# Patient Record
Sex: MALE | Race: WHITE | Employment: OTHER | URBAN - METROPOLITAN AREA
[De-identification: names, ages, dates, MRNs, and addresses within clinical notes are randomized per-mention and may not be internally consistent; named-entity substitution may affect disease eponyms.]

---

## 2017-04-05 ENCOUNTER — HOSPITAL ENCOUNTER (EMERGENCY)
Facility: CLINIC | Age: 47
Discharge: HOME | End: 2017-04-05
Attending: EMERGENCY MEDICINE | Admitting: EMERGENCY MEDICINE
Payer: COMMERCIAL

## 2017-04-05 ENCOUNTER — APPOINTMENT (OUTPATIENT)
Dept: GENERAL RADIOLOGY | Facility: CLINIC | Age: 47
End: 2017-04-05
Attending: EMERGENCY MEDICINE
Payer: COMMERCIAL

## 2017-04-05 VITALS
TEMPERATURE: 97.6 F | RESPIRATION RATE: 18 BRPM | WEIGHT: 240 LBS | OXYGEN SATURATION: 97 % | HEIGHT: 77 IN | DIASTOLIC BLOOD PRESSURE: 80 MMHG | SYSTOLIC BLOOD PRESSURE: 123 MMHG | BODY MASS INDEX: 28.34 KG/M2

## 2017-04-05 DIAGNOSIS — R07.1 PAINFUL RESPIRATION: ICD-10-CM

## 2017-04-05 LAB
ALBUMIN SERPL-MCNC: 3.6 G/DL (ref 3.4–5)
ALP SERPL-CCNC: 119 U/L (ref 40–150)
ALT SERPL W P-5'-P-CCNC: 23 U/L (ref 0–70)
ANION GAP SERPL CALCULATED.3IONS-SCNC: 7 MMOL/L (ref 3–14)
AST SERPL W P-5'-P-CCNC: 19 U/L (ref 0–45)
BASOPHILS # BLD AUTO: 0.1 10E9/L (ref 0–0.2)
BASOPHILS NFR BLD AUTO: 1.1 %
BILIRUB SERPL-MCNC: 0.1 MG/DL (ref 0.2–1.3)
BUN SERPL-MCNC: 17 MG/DL (ref 7–30)
CALCIUM SERPL-MCNC: 8 MG/DL (ref 8.5–10.1)
CHLORIDE SERPL-SCNC: 102 MMOL/L (ref 94–109)
CO2 SERPL-SCNC: 30 MMOL/L (ref 20–32)
CREAT SERPL-MCNC: 1.04 MG/DL (ref 0.66–1.25)
D DIMER PPP FEU-MCNC: NORMAL UG/ML FEU (ref 0–0.5)
DIFFERENTIAL METHOD BLD: NORMAL
EOSINOPHIL # BLD AUTO: 0.1 10E9/L (ref 0–0.7)
EOSINOPHIL NFR BLD AUTO: 1.8 %
ERYTHROCYTE [DISTWIDTH] IN BLOOD BY AUTOMATED COUNT: 13.7 % (ref 10–15)
GFR SERPL CREATININE-BSD FRML MDRD: 76 ML/MIN/1.7M2
GLUCOSE SERPL-MCNC: 110 MG/DL (ref 70–99)
HCT VFR BLD AUTO: 41.2 % (ref 40–53)
HGB BLD-MCNC: 14.1 G/DL (ref 13.3–17.7)
IMM GRANULOCYTES # BLD: 0 10E9/L (ref 0–0.4)
IMM GRANULOCYTES NFR BLD: 0.3 %
INTERPRETATION ECG - MUSE: NORMAL
LYMPHOCYTES # BLD AUTO: 2.8 10E9/L (ref 0.8–5.3)
LYMPHOCYTES NFR BLD AUTO: 37.4 %
MCH RBC QN AUTO: 30.5 PG (ref 26.5–33)
MCHC RBC AUTO-ENTMCNC: 34.2 G/DL (ref 31.5–36.5)
MCV RBC AUTO: 89 FL (ref 78–100)
MONOCYTES # BLD AUTO: 0.7 10E9/L (ref 0–1.3)
MONOCYTES NFR BLD AUTO: 9 %
NEUTROPHILS # BLD AUTO: 3.7 10E9/L (ref 1.6–8.3)
NEUTROPHILS NFR BLD AUTO: 50.4 %
NRBC # BLD AUTO: 0 10*3/UL
NRBC BLD AUTO-RTO: 0 /100
NT-PROBNP SERPL-MCNC: 202 PG/ML (ref 0–450)
PLATELET # BLD AUTO: 319 10E9/L (ref 150–450)
POTASSIUM SERPL-SCNC: 3.9 MMOL/L (ref 3.4–5.3)
PROT SERPL-MCNC: 6.4 G/DL (ref 6.8–8.8)
RBC # BLD AUTO: 4.63 10E12/L (ref 4.4–5.9)
SODIUM SERPL-SCNC: 139 MMOL/L (ref 133–144)
TROPONIN I BLD-MCNC: 0 UG/L (ref 0–0.1)
TROPONIN I SERPL-MCNC: NORMAL UG/L (ref 0–0.04)
WBC # BLD AUTO: 7.4 10E9/L (ref 4–11)

## 2017-04-05 PROCEDURE — 25000132 ZZH RX MED GY IP 250 OP 250 PS 637: Mod: GY | Performed by: EMERGENCY MEDICINE

## 2017-04-05 PROCEDURE — 85379 FIBRIN DEGRADATION QUANT: CPT | Performed by: EMERGENCY MEDICINE

## 2017-04-05 PROCEDURE — 93005 ELECTROCARDIOGRAM TRACING: CPT

## 2017-04-05 PROCEDURE — 71020 XR CHEST 2 VW: CPT

## 2017-04-05 PROCEDURE — 36415 COLL VENOUS BLD VENIPUNCTURE: CPT | Performed by: EMERGENCY MEDICINE

## 2017-04-05 PROCEDURE — 25000128 H RX IP 250 OP 636: Performed by: EMERGENCY MEDICINE

## 2017-04-05 PROCEDURE — 84484 ASSAY OF TROPONIN QUANT: CPT

## 2017-04-05 PROCEDURE — 83880 ASSAY OF NATRIURETIC PEPTIDE: CPT | Performed by: EMERGENCY MEDICINE

## 2017-04-05 PROCEDURE — 96374 THER/PROPH/DIAG INJ IV PUSH: CPT

## 2017-04-05 PROCEDURE — 80053 COMPREHEN METABOLIC PANEL: CPT | Performed by: EMERGENCY MEDICINE

## 2017-04-05 PROCEDURE — 96375 TX/PRO/DX INJ NEW DRUG ADDON: CPT

## 2017-04-05 PROCEDURE — A9270 NON-COVERED ITEM OR SERVICE: HCPCS | Mod: GY | Performed by: EMERGENCY MEDICINE

## 2017-04-05 PROCEDURE — 84484 ASSAY OF TROPONIN QUANT: CPT | Mod: 91 | Performed by: EMERGENCY MEDICINE

## 2017-04-05 PROCEDURE — 85025 COMPLETE CBC W/AUTO DIFF WBC: CPT | Performed by: EMERGENCY MEDICINE

## 2017-04-05 PROCEDURE — 99285 EMERGENCY DEPT VISIT HI MDM: CPT | Mod: 25

## 2017-04-05 RX ORDER — IBUPROFEN 800 MG/1
800 TABLET, FILM COATED ORAL EVERY 8 HOURS PRN
Qty: 30 TABLET | Refills: 0 | Status: ON HOLD | OUTPATIENT
Start: 2017-04-05 | End: 2019-02-18

## 2017-04-05 RX ORDER — ASPIRIN 325 MG
325 TABLET ORAL ONCE
Status: COMPLETED | OUTPATIENT
Start: 2017-04-05 | End: 2017-04-05

## 2017-04-05 RX ORDER — HYDROMORPHONE HYDROCHLORIDE 1 MG/ML
0.5 INJECTION, SOLUTION INTRAMUSCULAR; INTRAVENOUS; SUBCUTANEOUS
Status: DISCONTINUED | OUTPATIENT
Start: 2017-04-05 | End: 2017-04-05 | Stop reason: HOSPADM

## 2017-04-05 RX ORDER — KETOROLAC TROMETHAMINE 30 MG/ML
30 INJECTION, SOLUTION INTRAMUSCULAR; INTRAVENOUS ONCE
Status: COMPLETED | OUTPATIENT
Start: 2017-04-05 | End: 2017-04-05

## 2017-04-05 RX ADMIN — KETOROLAC TROMETHAMINE 30 MG: 30 INJECTION, SOLUTION INTRAMUSCULAR at 01:48

## 2017-04-05 RX ADMIN — ASPIRIN 325 MG ORAL TABLET 325 MG: 325 PILL ORAL at 01:48

## 2017-04-05 RX ADMIN — HYDROMORPHONE HYDROCHLORIDE 0.5 MG: 1 INJECTION, SOLUTION INTRAMUSCULAR; INTRAVENOUS; SUBCUTANEOUS at 01:48

## 2017-04-05 ASSESSMENT — ENCOUNTER SYMPTOMS
COUGH: 0
SORE THROAT: 0
FEVER: 0

## 2017-04-05 NOTE — ED AVS SNAPSHOT
[facility]    [address]    [facility] [address]    Phone:  [phone]    Fax:  [phone]                                       Rah Pearl   MRN: [id number]    Department:  [facility]   Date of Visit:  4/5/2017           Patient Information     Date Of Birth          1970        Your diagnoses for this visit were:     Painful respiration        You were seen by Chuck Carvalho MD.      Follow-up Information     Follow up with Gerry Holliday Schedule an appointment as soon as possible for a visit in 2 days.    Specialty:  Anesthesiology    Contact information:    [address] [address]  [phone]          Follow up with [facility].    Specialty:  EMERGENCY MEDICINE    Why:  As needed, If symptoms worsen    Contact information:    [address] [facility] [id number] [phone]      Discharge References/Attachments     PLEURISY (ENGLISH)      24 Hour Appointment Hotline       To make an appointment at any [facility], call [phone] ([phone]). If you don't have a family doctor or clinic, we will help you find one. [facility] are conveniently located to serve the needs of you and your family.             Review of your medicines      START taking        Dose / Directions Last dose taken    ibuprofen 800 MG tablet   Commonly known as:  ADVIL/MOTRIN   Dose:  800 mg   Quantity:  30 tablet        Take 1 tablet (800 mg) by mouth every 8 hours as needed for moderate pain   Refills:  0          Our records show that you are taking the medicines listed below. If these are incorrect, please call your family doctor or clinic.        Dose / Directions Last dose taken    hydrOXYzine 25 MG tablet   Commonly known as:  ATARAX   Dose:  50 mg   Quantity:  50 tablet        Take 2 tablets (50 mg) by  mouth every 6 hours as needed for itching   Refills:  0        latanoprost 0.005 % ophthalmic solution   Commonly known as:  XALATAN   Dose:  1 drop        Place 1 drop into both eyes At Bedtime   Refills:  0        LEXAPRO PO        Refills:  0        morphine 30 MG 12 hr tablet   Commonly known as:  MS CONTIN   Dose:  30 mg   Quantity:  30 tablet        Take 1 tablet (30 mg) by mouth every 12 hours   Refills:  0        ULTRAM PO        Refills:  0                Prescriptions were sent or printed at these locations (1 Prescription)                   Other Prescriptions                Printed at Department/Unit printer (1 of 1)         ibuprofen (ADVIL/MOTRIN) 800 MG tablet                Procedures and tests performed during your visit     BNP    CBC with platelets differential    Chest XR,  PA & LAT    Comprehensive metabolic panel    D dimer quantitative    EKG 12 lead    Troponin I    Troponin POCT      Orders Needing Specimen Collection     None      Pending Results     Date and Time Order Name Status Description    4/5/2017 0238 Chest XR,  PA & LAT Preliminary     4/5/2017 0036 EKG 12 lead Preliminary             Pending Culture Results     No orders found from 4/3/2017 to 4/6/2017.            Test Results From Your Hospital Stay        4/5/2017  1:06 AM      Component Results     Component Value Ref Range & Units Status    Troponin I 0.00 0.00 - 0.10 ug/L Final         4/5/2017  1:57 AM      Component Results     Component Value Ref Range & Units Status    WBC 7.4 4.0 - 11.0 10e9/L Final    RBC Count 4.63 4.4 - 5.9 10e12/L Final    Hemoglobin 14.1 13.3 - 17.7 g/dL Final    Hematocrit 41.2 40.0 - 53.0 % Final    MCV 89 78 - 100 fl Final    MCH 30.5 26.5 - 33.0 pg Final    MCHC 34.2 31.5 - 36.5 g/dL Final    RDW 13.7 10.0 - 15.0 % Final    Platelet Count 319 150 - 450 10e9/L Final    Diff Method Automated Method  Final    % Neutrophils 50.4 % Final    % Lymphocytes 37.4 % Final    % Monocytes 9.0 % Final    %  Eosinophils 1.8 % Final    % Basophils 1.1 % Final    % Immature Granulocytes 0.3 % Final    Nucleated RBCs 0 0 /100 Final    Absolute Neutrophil 3.7 1.6 - 8.3 10e9/L Final    Absolute Lymphocytes 2.8 0.8 - 5.3 10e9/L Final    Absolute Monocytes 0.7 0.0 - 1.3 10e9/L Final    Absolute Eosinophils 0.1 0.0 - 0.7 10e9/L Final    Absolute Basophils 0.1 0.0 - 0.2 10e9/L Final    Abs Immature Granulocytes 0.0 0 - 0.4 10e9/L Final    Absolute Nucleated RBC 0.0  Final         4/5/2017  2:16 AM      Component Results     Component Value Ref Range & Units Status    Sodium 139 133 - 144 mmol/L Final    Potassium 3.9 3.4 - 5.3 mmol/L Final    Chloride 102 94 - 109 mmol/L Final    Carbon Dioxide 30 20 - 32 mmol/L Final    Anion Gap 7 3 - 14 mmol/L Final    Glucose 110 (H) 70 - 99 mg/dL Final    Urea Nitrogen 17 7 - 30 mg/dL Final    Creatinine 1.04 0.66 - 1.25 mg/dL Final    GFR Estimate 76 >60 mL/min/1.7m2 Final    Non  GFR Calc    GFR Estimate If Black >90   GFR Calc   >60 mL/min/1.7m2 Final    Calcium 8.0 (L) 8.5 - 10.1 mg/dL Final    Bilirubin Total 0.1 (L) 0.2 - 1.3 mg/dL Final    Albumin 3.6 3.4 - 5.0 g/dL Final    Protein Total 6.4 (L) 6.8 - 8.8 g/dL Final    Alkaline Phosphatase 119 40 - 150 U/L Final    ALT 23 0 - 70 U/L Final    AST 19 0 - 45 U/L Final         4/5/2017  1:59 AM      Component Results     Component Value Ref Range & Units Status    Troponin I ES  0.000 - 0.045 ug/L Final    <0.015  The 99th percentile for upper reference range is 0.045 ug/L.  Troponin values in   the range of 0.045 - 0.120 ug/L may be associated with risks of adverse   clinical events.                 4/5/2017  1:59 AM      Component Results     Component Value Ref Range & Units Status    N-Terminal Pro BNP Inpatient 202 0 - 450 pg/mL Final    Reference range shown and results flagged as abnormal are suggested inpatient   cut points for confirming diagnosis if CHF in an acute setting. Establishing   a    baseline value for each individual patient is useful for follow-up. An   inpatient or emergency department NT-proPBNP <300 pg/mL effectively rules out   acute CHF, with 99% negative predictive value.  The outpatient non-acute reference range for ruling out CHF is:   0-125 pg/mL (age 18 to less than 75)   0-450 pg/mL (age 75 yrs and older)           4/5/2017  2:25 AM      Component Results     Component Value Ref Range & Units Status    D Dimer  0.0 - 0.50 ug/ml FEU Final    <0.3  This D-dimer assay is intended for use in conjuntion with a clinical pretest   probability assessment model to exclude pulmonary embolism (PE) and as an aid   in the diagnosis of deep venous thrombosis (DVT) in outpatients suspected of PE   or DVT. The cut-off value is 0.5 g/mL FEU.           4/5/2017  3:40 AM      Narrative     XR CHEST 2 VW  4/5/2017 3:25 AM      HISTORY: Chest pain.     COMPARISON: None.    FINDINGS: The heart size is normal. Mild probable scarring at the left  lung base. The lungs are otherwise clear. No pneumothorax or pleural  effusion. Postoperative changes in the cervical spine.        Impression     IMPRESSION: No acute abnormality.                Clinical Quality Measure: Blood Pressure Screening     Your blood pressure was checked while you were in the emergency department today. The last reading we obtained was  BP: 123/85 . Please read the guidelines below about what these numbers mean and what you should do about them.  If your systolic blood pressure (the top number) is less than 120 and your diastolic blood pressure (the bottom number) is less than 80, then your blood pressure is normal. There is nothing more that you need to do about it.  If your systolic blood pressure (the top number) is 120-139 or your diastolic blood pressure (the bottom number) is 80-89, your blood pressure may be higher than it should be. You should have your blood pressure rechecked within a year by a primary care provider.  If your  "systolic blood pressure (the top number) is 140 or greater or your diastolic blood pressure (the bottom number) is 90 or greater, you may have high blood pressure. High blood pressure is treatable, but if left untreated over time it can put you at risk for heart attack, stroke, or kidney failure. You should have your blood pressure rechecked by a primary care provider within the next 4 weeks.  If your provider in the emergency department today gave you specific instructions to follow-up with your doctor or provider even sooner than that, you should follow that instruction and not wait for up to 4 weeks for your follow-up visit.        Thank you for choosing [facility]       Thank you for choosing [facility] for your care. Our goal is always to provide you with excellent care. Hearing back from our patients is one way we can continue to improve our services. Please take a few minutes to complete the written survey that you may receive in the mail after you visit with us. Thank you!        .[employer] Information     [employer] lets you send messages to your doctor, view your test results, renew your prescriptions, schedule appointments and more. To sign up, go to [email or web address] . Click on \"Log in\" on the left side of the screen, which will take you to the Welcome page. Then click on \"Sign up Now\" on the right side of the page.     You will be asked to enter the access code listed below, as well as some personal information. Please follow the directions to create your username and password.     Your access code is: [email or web address]  Expires: 2017  4:36 AM     Your access code will  in 90 days. If you need help or a new code, please call your [facility] clinic or [phone].        Care EveryWhere ID     This is your Care EveryWhere ID. This could be used by other organizations to access your [facility] medical records  [id number]        After Visit Summary       This is your record. Keep this with you and show to " your community pharmacist(s) and doctor(s) at your next visit.

## 2017-04-05 NOTE — ED PROVIDER NOTES
"  History     Chief Complaint:  Chest Pain    HPI   Rah Pearl is a 46 year old male with a PMH significant for previous tobacco use who presents to the emergency department for evaluation of chest pain. The patient reports onset of pleuritic chest pain approximately 10 hours that has been progressively worsening throughout the day and which became especially severe about two hours ago. His pain is worse when he lays flat. He is unsure if eating affects the pain. He denies having pain like this in the past. He has not taken an Aspirin or NSAIDs today. The patient denies recent fevers, cough, or sore throat. Of note, the patient has a pain contract for chronic back pain.     PE/DVT Risk Factors:   Hx of PE/DVT:   Negative  Hx of clotting disorder:  Negative  Tobacco use:    Positive  Prolonged immobilization:  Negative  Recent surgery:   Negative  Recent travel:    Negative    Allergies:  Oxycontin     Medications:    Ultram  Lexapro  MS Contin  Atarax     Past Medical History:    Lumbar spondylitis  Radiculopathy    Past Surgical History:    Appendectomy  Back surgery  Laminectomy cervical posterior     Family History:   History reviewed. No pertinent family history.    Social History:   Tobacco use:    Former 1.00 ppd smoker who quit in 2011  Alcohol use:    Negative  Marital status:       Accompanied to ED by:  Wife    Review of Systems   Constitutional: Negative for fever.   HENT: Negative for sore throat.    Respiratory: Negative for cough.    Cardiovascular: Positive for chest pain.   All other systems reviewed and are negative.    Physical Exam   First Vitals:  BP: (!) 165/103  Heart Rate: 68  Temp: 97.6  F (36.4  C)  Resp: 18  Height: 195.6 cm (6' 5\")  Weight: 108.9 kg (240 lb)  SpO2: 97 %      Physical Exam  Nursing note and vitals reviewed.  Constitutional: Cooperative.   HENT:   Mouth/Throat: Moist mucous membranes.   Eyes: EOMI, nonicteric sclera  Cardiovascular: Normal rate, regular rhythm, " no murmurs, rubs, or gallops. No pain to palpation.   Pulmonary/Chest: Effort normal and breath sounds normal. No respiratory distress. No wheezes. No rales.   Abdominal: Soft. Nontender, nondistended, no guarding or rigidity. BS present.   Musculoskeletal: Normal range of motion.   Neurological: Alert. Moves all extremities spontaneously.   Skin: Skin is warm and dry. No rash noted.   Psychiatric: Normal mood and affect.       Emergency Department Course   ECG (01:23:23):  Indication: Screening for cardiovascular disease.   Rate 65 bpm. CO interval 172. QRS duration 96. QT/QTc 412/428. P-R-T axes 15 -5 20.   Interpretation: Normal sinus rhythm. Incomplete right  bundle branch block. Inferior infarct, age undetermined. Abnormal ECG.   Agree with computer interpretation.   Interpreted at 0131 by Dr. Carvalho    Imaging:  Radiographic findings were communicated with the patient who voiced understanding of the findings.  XR chest:  No acute abnormality.   Radiology report.     Laboratory:  CBC: WNL (WBC 7.4, HGB 14.1, )   CMP: Glucose 110 high, calcium 8.0 low, bilirubin 0.1 low, protein total 6.4 low, o/w WNL (Creatinine 1.04)  Troponin (0045): <0.015  Troponin POCT (0052): 0.00  D-dimer: <0.3  BNP: 202    Emergency Department Course:  Nursing notes and vitals reviewed.   0035: I performed an exam of the patient as documented above.   The above workup was undertaken.   0148: Dilaudid 0.5 mg IV  0148: Aspirin 325 mg Tablet PO  0148: Toradol 30 mg IV  I personally reviewed the laboratory results with the Patient and answered all related questions prior to discharge.   Findings and plan explained to the Patient. Patient discharged home with instructions regarding supportive care, medications, and reasons to return. The importance of close follow-up was reviewed. The patient was prescribed Motrin.    Impression & Plan      Medical Decision Making:  Rah Pearl is a 46 year old male who presents for  evaluation of chest pain.  This seems likely related to pleuritis given the positional character and pain that increases with increased deep breath as well as response to NSAIDS.  D-dimer negative to effectively rule out PE.  The work up in the Emergency Department is negative.  I considered a broad differential diagnosis for the patient's chest pain including life threatening etiologies such as acute coronary syndrome, myocardial infarction, pulmonary embolism, acute aortic dissection, myocarditis, pericarditis, acute valvular insufficiency amongst others.  Other causes considered included pneumonia, pneumothorax, chest wall source, pericarditis, pleurisy, esophageal spasm, etc.      No serious etiology for the chest pain were detected today during this visit.  Close follow up with primary care is indicated should the pain continue, as further work up may be performed; this was made clear to the patient, who understands.     Diagnosis:    ICD-10-CM    1. Painful respiration R07.1        Disposition:  Discharged to home.    Discharge Medications:  New Prescriptions    IBUPROFEN (ADVIL/MOTRIN) 800 MG TABLET    Take 1 tablet (800 mg) by mouth every 8 hours as needed for moderate pain       I, Uche De Leon, am serving as a scribe at 1:28 AM on 4/5/2017 to document services personally performed by Dr. Carvalho, based on my observations and the provider's statements to me.   Uche De Leon  4/5/2017   [facility] EMERGENCY DEPARTMENT       Chuck Carvalho MD  04/05/17 0994

## 2017-04-05 NOTE — ED NOTES
Patient arrives to ED due CP . Reports pain developed approx at 1500 yesterday. Worse on inspiration and has been constant. Denies any fever, cough, sob or any other symptoms   ABC intact  A/O x4

## 2017-04-05 NOTE — ED AVS SNAPSHOT
[facility] Emergency Department    [address]    [facility] [address]    Phone:  [phone]    Fax:  [phone]                                       Rah Pearl   MRN: [id number]    Department:  [facility] Emergency Department   Date of Visit:  4/5/2017           After Visit Summary Signature Page     I have received my discharge instructions, and my questions have been answered. I have discussed any challenges I see with this plan with the nurse or doctor.    ..........................................................................................................................................  Patient/Patient Representative Signature      ..........................................................................................................................................  Patient Representative Print Name and Relationship to Patient    ..................................................               ................................................  Date                                            Time    ..........................................................................................................................................  Reviewed by Signature/Title    ...................................................              ..............................................  Date                                                            Time

## 2017-04-06 LAB — INTERPRETATION ECG - MUSE: NORMAL

## 2019-01-26 ENCOUNTER — APPOINTMENT (OUTPATIENT)
Dept: GENERAL RADIOLOGY | Facility: CLINIC | Age: 49
End: 2019-01-26
Payer: COMMERCIAL

## 2019-01-26 ENCOUNTER — HOSPITAL ENCOUNTER (EMERGENCY)
Facility: CLINIC | Age: 49
Discharge: HOME | End: 2019-01-26
Attending: EMERGENCY MEDICINE | Admitting: EMERGENCY MEDICINE
Payer: COMMERCIAL

## 2019-01-26 VITALS
RESPIRATION RATE: 16 BRPM | SYSTOLIC BLOOD PRESSURE: 133 MMHG | DIASTOLIC BLOOD PRESSURE: 98 MMHG | TEMPERATURE: 98.6 F | OXYGEN SATURATION: 93 % | HEART RATE: 92 BPM

## 2019-01-26 DIAGNOSIS — S50.11XA CONTUSION OF RIGHT FOREARM, INITIAL ENCOUNTER: ICD-10-CM

## 2019-01-26 PROCEDURE — 99283 EMERGENCY DEPT VISIT LOW MDM: CPT

## 2019-01-26 PROCEDURE — 73090 X-RAY EXAM OF FOREARM: CPT | Mod: RT

## 2019-01-26 PROCEDURE — 25000132 ZZH RX MED GY IP 250 OP 250 PS 637: Mod: GY | Performed by: EMERGENCY MEDICINE

## 2019-01-26 PROCEDURE — 73070 X-RAY EXAM OF ELBOW: CPT | Mod: RT

## 2019-01-26 RX ORDER — VALACYCLOVIR HYDROCHLORIDE 1 G/1
1000 TABLET, FILM COATED ORAL 2 TIMES DAILY PRN
COMMUNITY

## 2019-01-26 RX ORDER — MORPHINE SULFATE 15 MG/1
15 TABLET, FILM COATED, EXTENDED RELEASE ORAL ONCE
Status: COMPLETED | OUTPATIENT
Start: 2019-01-26 | End: 2019-01-26

## 2019-01-26 RX ADMIN — MORPHINE SULFATE 15 MG: 15 TABLET, EXTENDED RELEASE ORAL at 18:57

## 2019-01-26 NOTE — ED AVS SNAPSHOT
[facility] Emergency Department  [address]  [facility] [address]  Phone:  [phone]  Fax:  [phone]                                    Rah Pearl   MRN: [id number]    Department:  [facility] Emergency Department   Date of Visit:  1/26/2019           After Visit Summary Signature Page    I have received my discharge instructions, and my questions have been answered. I have discussed any challenges I see with this plan with the nurse or doctor.    ..........................................................................................................................................  Patient/Patient Representative Signature      ..........................................................................................................................................  Patient Representative Print Name and Relationship to Patient    ..................................................               ................................................  Date                                   Time    ..........................................................................................................................................  Reviewed by Signature/Title    ...................................................              ..............................................  Date                                               Time          22EPIC Rev 08/18

## 2019-01-27 NOTE — DISCHARGE INSTRUCTIONS
Ice and elevate the arm.  If notice increasing pain, numbness, swelling, strength changes return to the ED.  Have arm rechecked in 1 week.    Discharge Instructions  Extremity Injury    You were seen today for an injury to an extremity (arm, hand, leg, or foot). You may have a bruise, strain, or fracture (broken bone).    Generally, every Emergency Department visit should have a follow-up clinic visit with either a primary or a specialty clinic/provider. Please follow-up as instructed by your emergency provider today.  Return to the Emergency Department right away if:  Your pain seems to change or get worse or there is pain in a new area that wasn?t evaluated today.  Your extremity becomes pale, cool, blue, or numb or tingling past the injury.  You have more drainage, redness or pain in the area of the cut or abrasion.  You have pain that you cannot control with the medicine recommended or prescribed here, or you have pain that seems too much for your injury.  Your child (who is injured) will not stop crying or is much more fussy than normal.  You have new symptoms or anything that worries you.    What to Expect:  Your swelling and pain may be worse the day after your injury, but should not be severe and should start getting better after that. You should not have new symptoms and your pain should not get worse.  You may start to get a bruise over the injured area or below the injured area (bruising can follow gravity).  Your movement and strength should get better with time.  Some injuries may not show up until after you have left the Emergency Department so it is important to follow-up as directed.  Your injury may prevent you from working.  Follow-up with your regular provider to get a work release note.  Pain medications or your injury may make it unsafe to drive or operate machinery.    Home Care:  RICE: Rest, Ice, Compression, Elevation  Rest: Rest your injured area for at least 1-2 days. After that you may  start using your extremity again as long as there is not too much pain.   Ice: Apply ice your injured area for 15 minutes at a time, at least 3 times a day. Use a cloth between the ice bag and your skin to prevent frostbite. Do not sleep with an ice pack or heating pad on, since this can cause burns or skin injury.  Compression: You may use an elastic bandage (Ace  Wrap) if it makes you more comfortable. Wrap it just tight enough to provide light compression, like a new pair of socks feels. Loosen the bandage if you have swelling past the bandage.  Elevation: Raise the injured area above the level of your heart as much as possible in the first 1-2 days.    Use Tylenol  (acetaminophen), Motrin (ibuprofen), or Advil  (ibuprofen) for your pain unless you have an allergy or are told not to use these medications by your provider.  Take the medications as instructed on the package. Tylenol  (acetaminophen) is in many prescription medicines and non-prescription medicines--check all of your medicines to be sure you aren?t taking more than 3000 mg per day.  Please follow any other instructions that were discussed with you by your provider.    Stretching/Exercises:  You may have been provided with instructions for stretching or exercises. If your injury was to your arm or shoulder and your provider put you in a sling or an immobilizer, it is important that you take off your immobilizer within 3 days and stretch/move your shoulder, unless your provider specifically tells you to not move your shoulder.  This is to prevent further injury such as a ?frozen shoulder?.     If you were given a prescription for medicine here today, be sure to read all of the information (including the package insert) that comes with your prescription.  This will include important information about the medicine, its side effects, and any warnings that you need to know about.  The pharmacist who fills the prescription can provide more information and  answer questions you may have about the medicine.  If you have questions or concerns that the pharmacist cannot address, please call or return to the Emergency Department.     Remember that you can always come back to the Emergency Department if you are not able to see your regular provider in the amount of time listed above, if you get any new symptoms, or if there is anything that worries you.

## 2019-01-27 NOTE — ED PROVIDER NOTES
History   Chief Complaint:  Arm Injury    HPI   Rah Pearl is a right-handed, non-anticoagulated 48 year old male with a history of lumbar spondylitis who presents for evaluation after an arm injury. The patient reports that at 1500 while walking outside he slipped on ice and fell, hitting his right arm on a  on the ground. He endorses developing an abrasion and a hematoma along with pain to his right elbow after the incident. He did not ice the wound as he states it was too tender. The patient also notes having baseline numbness/tingling in his left arm from his long standing neck and back problems. Notably, the patient does have history of chronic back pain for which he takes morphine as-needed and Ultram. The patient denies chest pain or shortness of breath before the fall, hitting his head during the incident, and any new back pain or numbness/tingling.     Allergies:  Oxycontin      Medications:    Lexapro  Hydroxyzine  Morphine  Ultram    Past Medical History:    Arthritis   Chronic infection  Lumbar spondylitis   Radiculopathy  Spinal stenosis in cervical region  Backache    Past Surgical History:    Appendectomy  Back surgery  Laminectomy cervical posterior three + levels     Family History:    History reviewed. No pertinent family history.     Social History:  Smoking status: Former smoker  Alcohol use: No  Drug use: Opoid dependence with pain contract  Marital Status:   [2]     Review of Systems   All other systems reviewed and are negative.    Physical Exam   Patient Vitals for the past 24 hrs:   BP Temp Temp src Pulse Resp SpO2   01/26/19 1900 (!) 133/98 -- -- 92 -- 93 %   01/26/19 1830 -- -- -- -- -- 99 %   01/26/19 1816 (!) 154/131 98.6  F (37  C) Oral 108 16 97 %   01/26/19 1815 (!) 154/131 -- -- 118 -- 97 %     Physical Exam  General: Resting on the bed.  Sitting on the side of the bed  Head: No obvious trauma to head.  Ears, Nose, Throat:  External ears normal.  Nose  normal.  Clear TMs.    Eyes:  Conjunctivae clear.  Pupils are equal, round, and reactive.   Neck: Normal range of motion.  Neck supple.  non tender c spine.    CV: mild tachycardic rate and regular rhythm.  No murmurs.      Respiratory: Effort normal and breath sounds normal.  No wheezing or crackles.   Gastrointestinal: Soft.  No distension. There is no tenderness.    Musculoskeletal: Normal range of motion of the shoulder/elbow/wrist on the right side.  Non tender extremities to palpations except over the large posterior forearm contusion.  Remainder of MSK non tender.      Neuro: Alert. Moving all extremities appropriately.  Normal speech.  GCS 15.    Skin: Skin is warm and dry.  No rash noted. Linear abrasion on the posterior aspect of the right forearm    Emergency Department Course   Imaging:  Radiographic findings were communicated with the patient who voiced understanding of the findings.    XR Elbow Right 2 Views  1. Negative for fracture.  2. Soft tissue swelling over the proximal ulna.  As read by Radiology.    Interventions:  1857: Morphine 15 mg PO    Emergency Department Course:  Past medical records, nursing notes, and vitals reviewed.   1813: I performed an exam of the patient and obtained history, as documented above.  The patient was sent for a right elbow x-ray while in the emergency department, findings above.    184: I rechecked the patient. Explained findings to the patient.    Findings and plan explained to the patient. Patient discharged home with instructions regarding supportive care, medications, and reasons to return. The importance of close follow-up was reviewed.     Impression & Plan    Medical Decision Makin-year-old male with chronic pain presents with right arm pain.  Vital signs initially mildly tachycardic, hypertensive secondary to pain but improved on repeat.  Broad differential was pursued including but not limited to fracture, dislocation, sprain, strain, contusion,  compartment syndrome, neurovascular injury, abrasion, etc.  Overall patient is well-appearing nontoxic.  Patient does have some baseline numbness but reports no change from prior.  He denies any other injuries on head to toe examination.  There is a large contusion and hematoma to the posterior aspect of the right forearm.  He is able to range his elbow and wrist freely.  2+ distal radial pulse.  Sensation intact.   and strength intact.  X-rays negative for any acute fracture or dislocation.  There is soft tissue swelling appreciable.  No joint effusion.  Soft compartments otherwise with normal pulses and sensation, low suspicion for compartment syndrome.  Overall seems to be most likely a large contusion.  Patient was given one-time dose of his chronic pain medication.  He has chronic pain medications at home and will continue these.  He also has close follow-up with his primary doctor arranged.  Discussed at length that patient should ice, elevate, and do supportive cares for this.  A tetanus was advised given that patient has an abrasion his last tetanus was 2008.  He declined this.  Advised that patient have this rechecked in 1 week if discomfort continues for him.  He may need repeat x-ray at that time.  Advised strict return precautions.  He voiced understanding plan was discharged in stable improved condition.    Diagnosis:    ICD-10-CM   1. Contusion of right forearm, initial encounter S50.11XA       Disposition:  Discharged to home.      Brian Kaye  1/26/2019   [facility] EMERGENCY DEPARTMENT  I, Brian Kaye, am serving as a scribe at 6:13 PM on 1/26/2019 to document services personally performed by Jeannette Sylvester MD based on my observations and the provider's statements to me.        Jeannette Sylvester MD  01/26/19 1944

## 2019-01-27 NOTE — ED TRIAGE NOTES
Pt aox4, abcs intact. Pt was walking outside around 3pm when he slipped on the ice and landed on his right lower forearm. Pt has a 6 inch abrasion on the right forearm as well as hematoma. CMS intact.

## 2019-02-08 ENCOUNTER — TRANSFERRED RECORDS (OUTPATIENT)
Dept: HEALTH INFORMATION MANAGEMENT | Facility: CLINIC | Age: 49
End: 2019-02-08

## 2019-02-15 RX ORDER — TRAMADOL HYDROCHLORIDE 50 MG/1
100 TABLET ORAL EVERY 6 HOURS PRN
Status: ON HOLD | COMMUNITY
End: 2019-02-19

## 2019-02-15 RX ORDER — BACLOFEN 10 MG/1
10 TABLET ORAL DAILY PRN
COMMUNITY

## 2019-02-15 RX ORDER — MORPHINE SULFATE 15 MG/1
15 TABLET, FILM COATED, EXTENDED RELEASE ORAL 2 TIMES DAILY PRN
COMMUNITY

## 2019-02-18 ENCOUNTER — APPOINTMENT (OUTPATIENT)
Dept: GENERAL RADIOLOGY | Facility: CLINIC | Age: 49
End: 2019-02-18
Attending: ORTHOPAEDIC SURGERY
Payer: COMMERCIAL

## 2019-02-18 ENCOUNTER — ANESTHESIA EVENT (OUTPATIENT)
Dept: SURGERY | Facility: CLINIC | Age: 49
End: 2019-02-18
Payer: COMMERCIAL

## 2019-02-18 ENCOUNTER — HOSPITAL ENCOUNTER (INPATIENT)
Facility: CLINIC | Age: 49
LOS: 1 days | Discharge: HOME | End: 2019-02-19
Attending: ORTHOPAEDIC SURGERY | Admitting: ORTHOPAEDIC SURGERY
Payer: COMMERCIAL

## 2019-02-18 ENCOUNTER — ANESTHESIA (OUTPATIENT)
Dept: SURGERY | Facility: CLINIC | Age: 49
End: 2019-02-18
Payer: COMMERCIAL

## 2019-02-18 DIAGNOSIS — M54.12 CERVICAL RADICULOPATHY: Primary | ICD-10-CM

## 2019-02-18 PROCEDURE — 25800030 ZZH RX IP 258 OP 636: Performed by: NURSE ANESTHETIST, CERTIFIED REGISTERED

## 2019-02-18 PROCEDURE — 27210794 ZZH OR GENERAL SUPPLY STERILE: Performed by: ORTHOPAEDIC SURGERY

## 2019-02-18 PROCEDURE — 25000125 ZZHC RX 250: Performed by: ORTHOPAEDIC SURGERY

## 2019-02-18 PROCEDURE — 36000069 ZZH SURGERY LEVEL 5 EA 15 ADDTL MIN: Performed by: ORTHOPAEDIC SURGERY

## 2019-02-18 PROCEDURE — 25000128 H RX IP 250 OP 636: Performed by: NURSE ANESTHETIST, CERTIFIED REGISTERED

## 2019-02-18 PROCEDURE — 25000132 ZZH RX MED GY IP 250 OP 250 PS 637: Performed by: ORTHOPAEDIC SURGERY

## 2019-02-18 PROCEDURE — 25000128 H RX IP 250 OP 636: Performed by: ORTHOPAEDIC SURGERY

## 2019-02-18 PROCEDURE — 40000170 ZZH STATISTIC PRE-PROCEDURE ASSESSMENT II: Performed by: ORTHOPAEDIC SURGERY

## 2019-02-18 PROCEDURE — 12000000 ZZH R&B MED SURG/OB

## 2019-02-18 PROCEDURE — 25000125 ZZHC RX 250: Performed by: NURSE ANESTHETIST, CERTIFIED REGISTERED

## 2019-02-18 PROCEDURE — 40000277 XR SURGERY CARM FLUORO LESS THAN 5 MIN W STILLS

## 2019-02-18 PROCEDURE — 0RP104Z REMOVAL OF INTERNAL FIXATION DEVICE FROM CERVICAL VERTEBRAL JOINT, OPEN APPROACH: ICD-10-PCS | Performed by: ORTHOPAEDIC SURGERY

## 2019-02-18 PROCEDURE — 25800029 ZZH RX IP 258 OP 250: Performed by: ORTHOPAEDIC SURGERY

## 2019-02-18 PROCEDURE — 01N10ZZ RELEASE CERVICAL NERVE, OPEN APPROACH: ICD-10-PCS | Performed by: ORTHOPAEDIC SURGERY

## 2019-02-18 PROCEDURE — 71000012 ZZH RECOVERY PHASE 1 LEVEL 1 FIRST HR: Performed by: ORTHOPAEDIC SURGERY

## 2019-02-18 PROCEDURE — 25000128 H RX IP 250 OP 636: Performed by: ANESTHESIOLOGY

## 2019-02-18 PROCEDURE — 36000067 ZZH SURGERY LEVEL 5 1ST 30 MIN: Performed by: ORTHOPAEDIC SURGERY

## 2019-02-18 PROCEDURE — 25000566 ZZH SEVOFLURANE, EA 15 MIN: Performed by: ORTHOPAEDIC SURGERY

## 2019-02-18 PROCEDURE — 37000008 ZZH ANESTHESIA TECHNICAL FEE, 1ST 30 MIN: Performed by: ORTHOPAEDIC SURGERY

## 2019-02-18 PROCEDURE — 37000009 ZZH ANESTHESIA TECHNICAL FEE, EACH ADDTL 15 MIN: Performed by: ORTHOPAEDIC SURGERY

## 2019-02-18 RX ORDER — VALACYCLOVIR HYDROCHLORIDE 1 G/1
1000 TABLET, FILM COATED ORAL 2 TIMES DAILY PRN
Status: DISCONTINUED | OUTPATIENT
Start: 2019-02-18 | End: 2019-02-18

## 2019-02-18 RX ORDER — DOCUSATE SODIUM 100 MG/1
100 CAPSULE, LIQUID FILLED ORAL 2 TIMES DAILY
Status: DISCONTINUED | OUTPATIENT
Start: 2019-02-18 | End: 2019-02-19 | Stop reason: HOSPADM

## 2019-02-18 RX ORDER — BACLOFEN 10 MG/1
10 TABLET ORAL DAILY PRN
Status: DISCONTINUED | OUTPATIENT
Start: 2019-02-18 | End: 2019-02-19 | Stop reason: HOSPADM

## 2019-02-18 RX ORDER — ONDANSETRON 2 MG/ML
4 INJECTION INTRAMUSCULAR; INTRAVENOUS EVERY 30 MIN PRN
Status: DISCONTINUED | OUTPATIENT
Start: 2019-02-18 | End: 2019-02-18 | Stop reason: HOSPADM

## 2019-02-18 RX ORDER — ONDANSETRON 2 MG/ML
INJECTION INTRAMUSCULAR; INTRAVENOUS PRN
Status: DISCONTINUED | OUTPATIENT
Start: 2019-02-18 | End: 2019-02-18

## 2019-02-18 RX ORDER — FENTANYL CITRATE 50 UG/ML
25-50 INJECTION, SOLUTION INTRAMUSCULAR; INTRAVENOUS
Status: DISCONTINUED | OUTPATIENT
Start: 2019-02-18 | End: 2019-02-18 | Stop reason: HOSPADM

## 2019-02-18 RX ORDER — LIDOCAINE HYDROCHLORIDE 20 MG/ML
INJECTION, SOLUTION INFILTRATION; PERINEURAL PRN
Status: DISCONTINUED | OUTPATIENT
Start: 2019-02-18 | End: 2019-02-18

## 2019-02-18 RX ORDER — CEFAZOLIN SODIUM 1 G/3ML
1 INJECTION, POWDER, FOR SOLUTION INTRAMUSCULAR; INTRAVENOUS SEE ADMIN INSTRUCTIONS
Status: DISCONTINUED | OUTPATIENT
Start: 2019-02-18 | End: 2019-02-18 | Stop reason: HOSPADM

## 2019-02-18 RX ORDER — GABAPENTIN 300 MG/1
300 CAPSULE ORAL
Status: COMPLETED | OUTPATIENT
Start: 2019-02-18 | End: 2019-02-18

## 2019-02-18 RX ORDER — PROCHLORPERAZINE MALEATE 10 MG
10 TABLET ORAL EVERY 6 HOURS PRN
Status: DISCONTINUED | OUTPATIENT
Start: 2019-02-18 | End: 2019-02-19 | Stop reason: HOSPADM

## 2019-02-18 RX ORDER — LIDOCAINE 40 MG/G
CREAM TOPICAL
Status: DISCONTINUED | OUTPATIENT
Start: 2019-02-18 | End: 2019-02-19 | Stop reason: HOSPADM

## 2019-02-18 RX ORDER — LATANOPROST 50 UG/ML
1 SOLUTION/ DROPS OPHTHALMIC AT BEDTIME
Status: DISCONTINUED | OUTPATIENT
Start: 2019-02-18 | End: 2019-02-19 | Stop reason: HOSPADM

## 2019-02-18 RX ORDER — HYDROMORPHONE HYDROCHLORIDE 1 MG/ML
.3-.5 INJECTION, SOLUTION INTRAMUSCULAR; INTRAVENOUS; SUBCUTANEOUS EVERY 10 MIN PRN
Status: DISCONTINUED | OUTPATIENT
Start: 2019-02-18 | End: 2019-02-18 | Stop reason: HOSPADM

## 2019-02-18 RX ORDER — HYDROCODONE BITARTRATE AND ACETAMINOPHEN 5; 325 MG/1; MG/1
1-2 TABLET ORAL EVERY 4 HOURS PRN
Status: DISCONTINUED | OUTPATIENT
Start: 2019-02-18 | End: 2019-02-19 | Stop reason: HOSPADM

## 2019-02-18 RX ORDER — ONDANSETRON 4 MG/1
4 TABLET, ORALLY DISINTEGRATING ORAL EVERY 30 MIN PRN
Status: DISCONTINUED | OUTPATIENT
Start: 2019-02-18 | End: 2019-02-18 | Stop reason: HOSPADM

## 2019-02-18 RX ORDER — OXYCODONE HYDROCHLORIDE 5 MG/1
5-10 TABLET ORAL
Status: DISCONTINUED | OUTPATIENT
Start: 2019-02-18 | End: 2019-02-18

## 2019-02-18 RX ORDER — SODIUM CHLORIDE, SODIUM LACTATE, POTASSIUM CHLORIDE, CALCIUM CHLORIDE 600; 310; 30; 20 MG/100ML; MG/100ML; MG/100ML; MG/100ML
INJECTION, SOLUTION INTRAVENOUS CONTINUOUS
Status: DISCONTINUED | OUTPATIENT
Start: 2019-02-18 | End: 2019-02-18 | Stop reason: HOSPADM

## 2019-02-18 RX ORDER — BUPIVACAINE HYDROCHLORIDE AND EPINEPHRINE 2.5; 5 MG/ML; UG/ML
INJECTION, SOLUTION EPIDURAL; INFILTRATION; INTRACAUDAL; PERINEURAL PRN
Status: DISCONTINUED | OUTPATIENT
Start: 2019-02-18 | End: 2019-02-18 | Stop reason: HOSPADM

## 2019-02-18 RX ORDER — PROPOFOL 10 MG/ML
INJECTION, EMULSION INTRAVENOUS PRN
Status: DISCONTINUED | OUTPATIENT
Start: 2019-02-18 | End: 2019-02-18

## 2019-02-18 RX ORDER — GLYCOPYRROLATE 0.2 MG/ML
INJECTION, SOLUTION INTRAMUSCULAR; INTRAVENOUS PRN
Status: DISCONTINUED | OUTPATIENT
Start: 2019-02-18 | End: 2019-02-18

## 2019-02-18 RX ORDER — ONDANSETRON 2 MG/ML
4 INJECTION INTRAMUSCULAR; INTRAVENOUS EVERY 6 HOURS PRN
Status: DISCONTINUED | OUTPATIENT
Start: 2019-02-18 | End: 2019-02-19 | Stop reason: HOSPADM

## 2019-02-18 RX ORDER — ACETAMINOPHEN 325 MG/1
650 TABLET ORAL EVERY 4 HOURS PRN
Status: DISCONTINUED | OUTPATIENT
Start: 2019-02-21 | End: 2019-02-19 | Stop reason: HOSPADM

## 2019-02-18 RX ORDER — NALOXONE HYDROCHLORIDE 0.4 MG/ML
.1-.4 INJECTION, SOLUTION INTRAMUSCULAR; INTRAVENOUS; SUBCUTANEOUS
Status: DISCONTINUED | OUTPATIENT
Start: 2019-02-18 | End: 2019-02-18 | Stop reason: HOSPADM

## 2019-02-18 RX ORDER — DIAZEPAM 5 MG
5 TABLET ORAL EVERY 6 HOURS PRN
Status: DISCONTINUED | OUTPATIENT
Start: 2019-02-18 | End: 2019-02-19 | Stop reason: HOSPADM

## 2019-02-18 RX ORDER — KETOROLAC TROMETHAMINE 30 MG/ML
INJECTION, SOLUTION INTRAMUSCULAR; INTRAVENOUS PRN
Status: DISCONTINUED | OUTPATIENT
Start: 2019-02-18 | End: 2019-02-18

## 2019-02-18 RX ORDER — ONDANSETRON 4 MG/1
4 TABLET, ORALLY DISINTEGRATING ORAL EVERY 6 HOURS PRN
Status: DISCONTINUED | OUTPATIENT
Start: 2019-02-18 | End: 2019-02-19 | Stop reason: HOSPADM

## 2019-02-18 RX ORDER — SODIUM CHLORIDE, SODIUM LACTATE, POTASSIUM CHLORIDE, CALCIUM CHLORIDE 600; 310; 30; 20 MG/100ML; MG/100ML; MG/100ML; MG/100ML
INJECTION, SOLUTION INTRAVENOUS CONTINUOUS PRN
Status: DISCONTINUED | OUTPATIENT
Start: 2019-02-18 | End: 2019-02-18

## 2019-02-18 RX ORDER — CEFAZOLIN SODIUM 1 G/3ML
1 INJECTION, POWDER, FOR SOLUTION INTRAMUSCULAR; INTRAVENOUS EVERY 8 HOURS
Status: COMPLETED | OUTPATIENT
Start: 2019-02-18 | End: 2019-02-19

## 2019-02-18 RX ORDER — EPHEDRINE SULFATE 50 MG/ML
INJECTION, SOLUTION INTRAMUSCULAR; INTRAVENOUS; SUBCUTANEOUS PRN
Status: DISCONTINUED | OUTPATIENT
Start: 2019-02-18 | End: 2019-02-18

## 2019-02-18 RX ORDER — NEOSTIGMINE METHYLSULFATE 1 MG/ML
VIAL (ML) INJECTION PRN
Status: DISCONTINUED | OUTPATIENT
Start: 2019-02-18 | End: 2019-02-18

## 2019-02-18 RX ORDER — NALOXONE HYDROCHLORIDE 0.4 MG/ML
.1-.4 INJECTION, SOLUTION INTRAMUSCULAR; INTRAVENOUS; SUBCUTANEOUS
Status: DISCONTINUED | OUTPATIENT
Start: 2019-02-18 | End: 2019-02-19 | Stop reason: HOSPADM

## 2019-02-18 RX ORDER — CEFAZOLIN SODIUM 2 G/100ML
2 INJECTION, SOLUTION INTRAVENOUS
Status: COMPLETED | OUTPATIENT
Start: 2019-02-18 | End: 2019-02-18

## 2019-02-18 RX ORDER — VANCOMYCIN HYDROCHLORIDE 1 G/20ML
INJECTION, POWDER, LYOPHILIZED, FOR SOLUTION INTRAVENOUS PRN
Status: DISCONTINUED | OUTPATIENT
Start: 2019-02-18 | End: 2019-02-18 | Stop reason: HOSPADM

## 2019-02-18 RX ORDER — NALOXONE HYDROCHLORIDE 0.4 MG/ML
.1-.4 INJECTION, SOLUTION INTRAMUSCULAR; INTRAVENOUS; SUBCUTANEOUS
Status: DISCONTINUED | OUTPATIENT
Start: 2019-02-18 | End: 2019-02-18

## 2019-02-18 RX ORDER — DEXAMETHASONE SODIUM PHOSPHATE 4 MG/ML
INJECTION, SOLUTION INTRA-ARTICULAR; INTRALESIONAL; INTRAMUSCULAR; INTRAVENOUS; SOFT TISSUE PRN
Status: DISCONTINUED | OUTPATIENT
Start: 2019-02-18 | End: 2019-02-18

## 2019-02-18 RX ORDER — HYDROXYZINE HYDROCHLORIDE 25 MG/1
25 TABLET, FILM COATED ORAL EVERY 6 HOURS PRN
Status: DISCONTINUED | OUTPATIENT
Start: 2019-02-18 | End: 2019-02-19 | Stop reason: HOSPADM

## 2019-02-18 RX ORDER — MEPERIDINE HYDROCHLORIDE 25 MG/ML
12.5 INJECTION INTRAMUSCULAR; INTRAVENOUS; SUBCUTANEOUS
Status: DISCONTINUED | OUTPATIENT
Start: 2019-02-18 | End: 2019-02-18 | Stop reason: HOSPADM

## 2019-02-18 RX ORDER — ACETAMINOPHEN 325 MG/1
975 TABLET ORAL EVERY 8 HOURS
Status: DISCONTINUED | OUTPATIENT
Start: 2019-02-18 | End: 2019-02-19 | Stop reason: HOSPADM

## 2019-02-18 RX ORDER — HYDROMORPHONE HYDROCHLORIDE 1 MG/ML
.3-.5 INJECTION, SOLUTION INTRAMUSCULAR; INTRAVENOUS; SUBCUTANEOUS
Status: DISCONTINUED | OUTPATIENT
Start: 2019-02-18 | End: 2019-02-19 | Stop reason: HOSPADM

## 2019-02-18 RX ORDER — FENTANYL CITRATE 50 UG/ML
INJECTION, SOLUTION INTRAMUSCULAR; INTRAVENOUS PRN
Status: DISCONTINUED | OUTPATIENT
Start: 2019-02-18 | End: 2019-02-18

## 2019-02-18 RX ORDER — BACITRACIN ZINC 500 [USP'U]/G
OINTMENT TOPICAL PRN
Status: DISCONTINUED | OUTPATIENT
Start: 2019-02-18 | End: 2019-02-18 | Stop reason: HOSPADM

## 2019-02-18 RX ORDER — SODIUM CHLORIDE 450 MG/100ML
INJECTION, SOLUTION INTRAVENOUS CONTINUOUS
Status: DISCONTINUED | OUTPATIENT
Start: 2019-02-18 | End: 2019-02-19 | Stop reason: HOSPADM

## 2019-02-18 RX ORDER — HYDROMORPHONE HYDROCHLORIDE 1 MG/ML
.3-.5 INJECTION, SOLUTION INTRAMUSCULAR; INTRAVENOUS; SUBCUTANEOUS EVERY 5 MIN PRN
Status: DISCONTINUED | OUTPATIENT
Start: 2019-02-18 | End: 2019-02-18 | Stop reason: HOSPADM

## 2019-02-18 RX ORDER — ACETAMINOPHEN 325 MG/1
975 TABLET ORAL ONCE
Status: COMPLETED | OUTPATIENT
Start: 2019-02-18 | End: 2019-02-18

## 2019-02-18 RX ORDER — FENTANYL CITRATE 50 UG/ML
50-100 INJECTION, SOLUTION INTRAMUSCULAR; INTRAVENOUS
Status: DISCONTINUED | OUTPATIENT
Start: 2019-02-18 | End: 2019-02-18 | Stop reason: HOSPADM

## 2019-02-18 RX ADMIN — HYDROMORPHONE HYDROCHLORIDE 0.5 MG: 1 INJECTION, SOLUTION INTRAMUSCULAR; INTRAVENOUS; SUBCUTANEOUS at 08:51

## 2019-02-18 RX ADMIN — PHENYLEPHRINE HYDROCHLORIDE 0.6 MCG/KG/MIN: 10 INJECTION, SOLUTION INTRAMUSCULAR; INTRAVENOUS; SUBCUTANEOUS at 08:11

## 2019-02-18 RX ADMIN — MIDAZOLAM HYDROCHLORIDE 2 MG: 1 INJECTION, SOLUTION INTRAMUSCULAR; INTRAVENOUS at 07:50

## 2019-02-18 RX ADMIN — Medication 5 MG: at 08:50

## 2019-02-18 RX ADMIN — CEFAZOLIN SODIUM 2 G: 2 INJECTION, SOLUTION INTRAVENOUS at 07:55

## 2019-02-18 RX ADMIN — PROPOFOL 350 MG: 10 INJECTION, EMULSION INTRAVENOUS at 07:50

## 2019-02-18 RX ADMIN — HYDROCODONE BITARTRATE AND ACETAMINOPHEN 2 TABLET: 5; 325 TABLET ORAL at 19:09

## 2019-02-18 RX ADMIN — ACETAMINOPHEN 975 MG: 325 TABLET, FILM COATED ORAL at 07:26

## 2019-02-18 RX ADMIN — Medication 0.5 MG: at 10:29

## 2019-02-18 RX ADMIN — FENTANYL CITRATE 50 MCG: 50 INJECTION, SOLUTION INTRAMUSCULAR; INTRAVENOUS at 08:29

## 2019-02-18 RX ADMIN — PHENYLEPHRINE HYDROCHLORIDE 100 MCG: 10 INJECTION, SOLUTION INTRAMUSCULAR; INTRAVENOUS; SUBCUTANEOUS at 08:10

## 2019-02-18 RX ADMIN — MIDAZOLAM HYDROCHLORIDE 1 MG: 1 INJECTION, SOLUTION INTRAMUSCULAR; INTRAVENOUS at 08:29

## 2019-02-18 RX ADMIN — SODIUM CHLORIDE, POTASSIUM CHLORIDE, SODIUM LACTATE AND CALCIUM CHLORIDE: 600; 310; 30; 20 INJECTION, SOLUTION INTRAVENOUS at 07:45

## 2019-02-18 RX ADMIN — LIDOCAINE HYDROCHLORIDE 100 MG: 20 INJECTION, SOLUTION INFILTRATION; PERINEURAL at 07:50

## 2019-02-18 RX ADMIN — GABAPENTIN 300 MG: 300 CAPSULE ORAL at 07:26

## 2019-02-18 RX ADMIN — ROCURONIUM BROMIDE 50 MG: 10 INJECTION INTRAVENOUS at 07:50

## 2019-02-18 RX ADMIN — Medication 0.5 MG: at 14:05

## 2019-02-18 RX ADMIN — CEFAZOLIN 1 G: 1 INJECTION, POWDER, FOR SOLUTION INTRAMUSCULAR; INTRAVENOUS at 23:42

## 2019-02-18 RX ADMIN — Medication 0.5 MG: at 10:03

## 2019-02-18 RX ADMIN — ACETAMINOPHEN 650 MG: 325 TABLET, FILM COATED ORAL at 15:57

## 2019-02-18 RX ADMIN — MIDAZOLAM HYDROCHLORIDE 1 MG: 1 INJECTION, SOLUTION INTRAMUSCULAR; INTRAVENOUS at 09:46

## 2019-02-18 RX ADMIN — HYDROCODONE BITARTRATE AND ACETAMINOPHEN 1 TABLET: 5; 325 TABLET ORAL at 15:58

## 2019-02-18 RX ADMIN — HYDROCODONE BITARTRATE AND ACETAMINOPHEN 2 TABLET: 5; 325 TABLET ORAL at 23:15

## 2019-02-18 RX ADMIN — PHENYLEPHRINE HYDROCHLORIDE 100 MCG: 10 INJECTION, SOLUTION INTRAMUSCULAR; INTRAVENOUS; SUBCUTANEOUS at 09:18

## 2019-02-18 RX ADMIN — KETOROLAC TROMETHAMINE 30 MG: 30 INJECTION, SOLUTION INTRAMUSCULAR at 09:11

## 2019-02-18 RX ADMIN — Medication 0.5 MG: at 12:07

## 2019-02-18 RX ADMIN — DEXMEDETOMIDINE HYDROCHLORIDE 12 MCG: 100 INJECTION, SOLUTION INTRAVENOUS at 09:47

## 2019-02-18 RX ADMIN — DEXTRAN 70, AND HYPROMELLOSE 2910 3 DROP: 1; 3 SOLUTION/ DROPS OPHTHALMIC at 23:42

## 2019-02-18 RX ADMIN — Medication 0.5 MG: at 10:16

## 2019-02-18 RX ADMIN — HYDROMORPHONE HYDROCHLORIDE 0.5 MG: 1 INJECTION, SOLUTION INTRAMUSCULAR; INTRAVENOUS; SUBCUTANEOUS at 08:34

## 2019-02-18 RX ADMIN — SODIUM CHLORIDE: 4.5 INJECTION, SOLUTION INTRAVENOUS at 12:19

## 2019-02-18 RX ADMIN — DEXAMETHASONE SODIUM PHOSPHATE 4 MG: 4 INJECTION, SOLUTION INTRA-ARTICULAR; INTRALESIONAL; INTRAMUSCULAR; INTRAVENOUS; SOFT TISSUE at 08:18

## 2019-02-18 RX ADMIN — FENTANYL CITRATE 50 MCG: 50 INJECTION, SOLUTION INTRAMUSCULAR; INTRAVENOUS at 07:50

## 2019-02-18 RX ADMIN — DEXMEDETOMIDINE HYDROCHLORIDE 0.5 MCG/KG/HR: 100 INJECTION, SOLUTION INTRAVENOUS at 08:10

## 2019-02-18 RX ADMIN — GLYCOPYRROLATE 0.2 MG: 0.2 INJECTION, SOLUTION INTRAMUSCULAR; INTRAVENOUS at 08:49

## 2019-02-18 RX ADMIN — HYDROXYZINE HYDROCHLORIDE 25 MG: 25 TABLET ORAL at 18:15

## 2019-02-18 RX ADMIN — NEOSTIGMINE METHYLSULFATE 4 MG: 1 INJECTION, SOLUTION INTRAVENOUS at 09:31

## 2019-02-18 RX ADMIN — CEFAZOLIN 1 G: 1 INJECTION, POWDER, FOR SOLUTION INTRAMUSCULAR; INTRAVENOUS at 16:02

## 2019-02-18 RX ADMIN — Medication 5 MG: at 09:18

## 2019-02-18 RX ADMIN — HYDROCODONE BITARTRATE AND ACETAMINOPHEN 1 TABLET: 5; 325 TABLET ORAL at 15:01

## 2019-02-18 RX ADMIN — ONDANSETRON 4 MG: 2 INJECTION INTRAMUSCULAR; INTRAVENOUS at 08:18

## 2019-02-18 RX ADMIN — GLYCOPYRROLATE 0.4 MG: 0.2 INJECTION, SOLUTION INTRAMUSCULAR; INTRAVENOUS at 09:31

## 2019-02-18 ASSESSMENT — COPD QUESTIONNAIRES: COPD: 0

## 2019-02-18 ASSESSMENT — ACTIVITIES OF DAILY LIVING (ADL)
ADLS_ACUITY_SCORE: 13

## 2019-02-18 ASSESSMENT — MIFFLIN-ST. JEOR: SCORE: 2111.37

## 2019-02-18 ASSESSMENT — LIFESTYLE VARIABLES: TOBACCO_USE: 1

## 2019-02-18 NOTE — BRIEF OP NOTE
[facility]    Brief Operative Note    Pre-operative diagnosis: CERVICAL RADICULOPATHY  Post-operative diagnosis same  Procedure: Procedure(s):  POSTERIOR CERVICAL FORAMINOTOMY OF LEFT C4-C5 C5-C6 AND HARDWARE REMOVAL  Remove hardware cervical posterior spine  Surgeon: Surgeon(s) and Role:     * Jonathon Acosta MD - Primary  Anesthesia: General   Estimated blood loss:10  Drains: 1  Specimens:none

## 2019-02-18 NOTE — OP NOTE
Procedure Date: 02/18/2019      SURGEON:  Jonathon Acosta MD      FIRST ASSISTANT:  Kenna Acevedo, [facility]       PREOPERATIVE DIAGNOSIS:  Recalcitrant classic left C5 radiculopathy.      POSTOPERATIVE DIAGNOSIS:  Recalcitrant classic left C5 radiculopathy.      PROCEDURE PERFORMED:  Laminoforaminotomy revision, C4-5, C5-6.      INSTRUMENTATION:  Segmental instrumentation removal C3-C6.      PREOPERATIVE INDICATIONS:  This 48-year-old male underwent a technically satisfactory spinal cord decompression.  This was years ago and he did well from that; He incrementally developed increasing difficulties with radiating left shoulder and proximal arm pain.  Preoperative evaluation that was notable for obvious deltoid external rotator and biceps weakness.  There was severe foraminal stenosis at the C4-C5 level.  This was osseous and off to the side; his spinal cord decompression looked fine. Based on persistence of symptoms and unwillingness to accept a fairly extensive fusion, he opted for a laminoforaminotomy as a stop gap maneuver.      PREOPERATIVE PROCEDURE:  The patient was taken to the operating and given a satisfactory general endotracheal anesthetic, was appropriately positioned, prepared and draped for posterior cervical surgery.  Dotson head-holding device was utilized.  Great care was to turn him prone turning head, neck and chest in mechanical unison.  Standard prepping and draping as well as protection of all neurovascular prominences ensued.  Preincision fluoroscopy was used to localize our incision.  The posterior aspect of the neck was wide and aseptically prepped and draped.  Standard timeout was accomplished.  Intravenous antibiotics were assured.      A unilateral subperiosteal exposure was fashioned.  The old incision was opened for approximately 3/4 of the length, there was a fairly dense scar plane and this was carried down to the elevated lamina and finding the instrumentation, we carefully  reflected the paraspinal musculature off the sides.  This was done with knowing full well there was exposed dura just immediately subjacent to the laminoplasty plates.  We identified and removed the 3 plates and the 9 screws.  We actually left the lateral mass screw of C4 and C5 in so as to have an indelible marker on the lateral mass, a Kocher clamp was placed on the middle plate and 2 independent observers reached the same conclusion regarding operative level.      We carefully cleaned the area of the proposed foraminotomy.  There was dense scar tissue present.  This was carefully cleaned.  Motorized dissection was used to thin and remove about a 30% facetectomy.  We came cautiously down to and about the exiting nerve root.  We identified the C5 nerve root and then working retrograde, worked back towards the spinal canal fully cognizant of the scarified dura and the post-laminectomy scar.  We identified both the axilla and the shoulder of the nerve root and decompressed them.  We carried the dissection suitably lateral so as to verify smooth nerve passage, very obvious osseous stenosis was present, undercut and decompressed.  At the termination of decompression, the nerve coursed freely through the foramen without extrinsic pressure.  Topical Gelfoam was used for hemostasis.        We then turned our attention to C5-C6 where similar decompression was carried out.  There, the  findings were notable for less stenosis but a bit more posteriorly based displacement, likely from that the annular bulge anteriorly at C5-6.  There at C6, the nerve again was inspected and found to be free.  Topical Gelfoam was used.  Vancomycin protocol was used for closure.  A layered anatomical closure over a suction drain consisting of multiple interrupted 0 Vicryl sutures reapproximate the paraspinal muscle, running along the ligamentum nuchae, 2-0 in the subcutaneous tissue, 3-0 Prolene in the skin completed the operation.  Blood loss  was less than 10 mL.  Sponge and needle count correct.  No intraoperative or technical complications.  A drain was used.      Final clinical diagnosis of obvious osseous foraminal stenosis requiring a laminoforaminotomy.       Nurse Kenna Acevedo was in attendance at all times.  She was critical to the safe and efficient performance of this procedure.  Her tasks included protection, retraction, and mobilization of neurovascular and visceral structures.  She was essential to the safe and timely performance of this procedure.         RICKY YOUNG MD             D: 2019   T: 2019   MT: ROX      Name:     CHEPE GALEANO   MRN:      [id number]        Account:        [id number]   :      1970           Procedure Date: 2019      Document: [id number]

## 2019-02-18 NOTE — ANESTHESIA CARE TRANSFER NOTE
Patient: Rah Pearl    Procedure(s):  POSTERIOR CERVICAL FORAMINOTOMY OF LEFT C4-C5 C5-C6 AND HARDWARE REMOVAL  Remove hardware cervical posterior spine    Diagnosis: CERVICAL RADICULOPATHY  Diagnosis Additional Information: No value filed.    Anesthesia Type:   General, ETT     Note:  Airway :Face Mask  Patient transferred to:PACU  Comments: A&O x 3.  C/o HA #8.  Dexmetatomidine given.  Denies N&V.  Report to RN.      Vitals: (Last set prior to Anesthesia Care Transfer)    CRNA VITALS  2/18/2019 0910 - 2/18/2019 0947      2/18/2019             Pulse:  81    SpO2:  100 %    Resp Rate (set):  10                Electronically Signed By: Alba Emmanuel  February 18, 2019  9:47 AM

## 2019-02-18 NOTE — ANESTHESIA POSTPROCEDURE EVALUATION
Patient: Rah Pearl    Procedure(s):  POSTERIOR CERVICAL FORAMINOTOMY OF LEFT C4-C5 C5-C6 AND HARDWARE REMOVAL  Remove hardware cervical posterior spine    Diagnosis:CERVICAL RADICULOPATHY  Diagnosis Additional Information: No value filed.    Anesthesia Type:  General, ETT    Note:  Anesthesia Post Evaluation    Patient location during evaluation: PACU  Patient participation: Able to fully participate in evaluation  Level of consciousness: awake and alert  Pain management: adequate  Airway patency: patent  Cardiovascular status: acceptable  Respiratory status: acceptable  Hydration status: acceptable  PONV: none     Anesthetic complications: None          Last vitals:  Vitals:    02/18/19 1405 02/18/19 1501 02/18/19 1600   BP: 131/85 130/78 132/81   Pulse:      Resp: 14 12 14   Temp:   36.4  C (97.6  F)   SpO2: 98%           Electronically Signed By: Del Sequeira MD  February 18, 2019  5:07 PM

## 2019-02-18 NOTE — PROGRESS NOTES
Admission medication history interview status for the 2/18/2019  admission is complete. See EPIC admission navigator for prior to admission medications     Medication history source reliability:Good    Medication history interview source(s):Patient    Medication history resources (including written lists, pill bottles, clinic record):None    Primary pharmacy.[facility]    Additional medication history information not noted on PTA med list :None    Time spent in this activity: 40 minutes    Prior to Admission medications    Medication Sig Last Dose Taking? Auth Provider   baclofen (LIORESAL) 10 MG tablet Take 10 mg by mouth daily as needed for muscle spasms  More than a month at PRN Yes Reported, Patient   latanoprost (XALATAN) 0.005 % ophthalmic solution Place 1 drop into both eyes nightly as needed  Past Week at PRN Yes Reported, Patient   morphine (MS CONTIN) 15 MG CR tablet Take 15 mg by mouth 2 times daily as needed  More than a month at PRN Yes Reported, Patient   traMADol (ULTRAM) 50 MG tablet Take 100 mg by mouth every 6 hours as needed for severe pain (2 x 50 mg = 100 mg dose) 2/18/2019 at 0500 Yes Reported, Patient   valACYclovir (VALTREX) 1000 mg tablet Take 1,000 mg by mouth 2 times daily as needed (outbreaks) For 3 days More than a Month at PRN  Reported, Patient

## 2019-02-18 NOTE — ANESTHESIA PREPROCEDURE EVALUATION
Anesthesia Pre-Procedure Evaluation    Patient: Rah Pearl   MRN: [id number] : 1970          Preoperative Diagnosis: CERVICAL RADICULOPATHY    Procedure(s):  POSTERIOR CERVICAL FORAMINOTOMY OF LEFT C4-C5 C5-C6(SITTING POSITION, MIDAS AM 8)    Past Medical History:   Diagnosis Date     Arthritis      Chronic infection     HSV     Chronic pain      Difficulty walking      Herpes      Low back pain      Lumbar spondylitis (H)      Neuralgia      Opioid type drug dependence (H)      Other chronic pain      Radiculopathy      Spinal stenosis     Cervical and lumbar     Past Surgical History:   Procedure Laterality Date     APPENDECTOMY      1976     BACK SURGERY      ; lumbar fusion     BACK SURGERY      hardware removal     BACK SURGERY      lamenectomy     BACK SURGERY      neck surgery ,      hardware removal      spine     LAMINECTOMY CERIVCAL POSTERIOR THREE+ LEVELS N/A 2016    Procedure: LAMINECTOMY CERVICAL POSTERIOR THREE+ LEVELS;  Surgeon: Jonathon Acosta MD;  Location: [facility] OR       Anesthesia Evaluation     . Pt has had prior anesthetic. Type: General    No history of anesthetic complications          ROS/MED HX    ENT/Pulmonary:     (+)tobacco use, Past use , . .   (-) asthma, COPD, sleep apnea and recent URI   Neurologic:     (+)neuropathy other neuro spinal stenosis    Cardiovascular:        (-) hypertension and CAD   METS/Exercise Tolerance:  >4 METS   Hematologic:  - neg hematologic  ROS       Musculoskeletal:         GI/Hepatic:        (-) GERD and liver disease   Renal/Genitourinary:      (-) renal disease   Endo:      (-) Type I DM and Type II DM   Psychiatric:         Infectious Disease:         Malignancy:         Other:    (+) H/O Chronic Pain,H/O chronic opiod use ,                         Physical Exam  Normal systems: cardiovascular, pulmonary and dental    Airway   Mallampati: I  TM distance: >3 FB  Neck ROM: full    Dental     Cardiovascular  "      Pulmonary             Lab Results   Component Value Date    WBC 7.4 04/05/2017    HGB 14.1 04/05/2017    HCT 41.2 04/05/2017     04/05/2017     04/05/2017    POTASSIUM 3.9 04/05/2017    CHLORIDE 102 04/05/2017    CO2 30 04/05/2017    BUN 17 04/05/2017    CR 1.04 04/05/2017     (H) 04/05/2017    KATHERYN 8.0 (L) 04/05/2017    ALBUMIN 3.6 04/05/2017    PROTTOTAL 6.4 (L) 04/05/2017    ALT 23 04/05/2017    AST 19 04/05/2017    ALKPHOS 119 04/05/2017    BILITOTAL 0.1 (L) 04/05/2017       Preop Vitals  BP Readings from Last 3 Encounters:   01/26/19 (!) 133/98   04/05/17 123/80   02/24/16 130/79    Pulse Readings from Last 3 Encounters:   01/26/19 92   02/22/16 79      Resp Readings from Last 3 Encounters:   01/26/19 16   04/05/17 18   02/24/16 16    SpO2 Readings from Last 3 Encounters:   01/26/19 93%   04/05/17 97%   02/24/16 94%      Temp Readings from Last 1 Encounters:   01/26/19 37  C (98.6  F) (Oral)    Ht Readings from Last 1 Encounters:   04/05/17 1.956 m (6' 5\")      Wt Readings from Last 1 Encounters:   04/05/17 108.9 kg (240 lb)    Estimated body mass index is 28.46 kg/m  as calculated from the following:    Height as of 4/5/17: 1.956 m (6' 5\").    Weight as of 4/5/17: 108.9 kg (240 lb).       Anesthesia Plan      History & Physical Review  History and physical reviewed and following examination; no interval change.    ASA Status:  3 .    NPO Status:  > 8 hours    Plan for General and ETT with Intravenous induction. Maintenance will be Balanced.    PONV prophylaxis:  Ondansetron (or other 5HT-3) and Dexamethasone or Solumedrol  Refuses to take out his contact lenses for the case. Discussed risk of eye injury.       Postoperative Care  Postoperative pain management:  IV analgesics.      Consents  Anesthetic plan, risks, benefits and alternatives discussed with:  Patient..                 Del Sequeira MD  "

## 2019-02-19 ENCOUNTER — APPOINTMENT (OUTPATIENT)
Dept: PHYSICAL THERAPY | Facility: CLINIC | Age: 49
End: 2019-02-19
Attending: ORTHOPAEDIC SURGERY
Payer: COMMERCIAL

## 2019-02-19 VITALS
DIASTOLIC BLOOD PRESSURE: 60 MMHG | SYSTOLIC BLOOD PRESSURE: 111 MMHG | WEIGHT: 250 LBS | HEIGHT: 77 IN | OXYGEN SATURATION: 100 % | RESPIRATION RATE: 18 BRPM | TEMPERATURE: 97.7 F | BODY MASS INDEX: 29.52 KG/M2 | HEART RATE: 68 BPM

## 2019-02-19 LAB
GLUCOSE BLDC GLUCOMTR-MCNC: 118 MG/DL (ref 70–99)
HGB BLD-MCNC: 13.6 G/DL (ref 13.3–17.7)

## 2019-02-19 PROCEDURE — 25000132 ZZH RX MED GY IP 250 OP 250 PS 637: Performed by: ORTHOPAEDIC SURGERY

## 2019-02-19 PROCEDURE — 36415 COLL VENOUS BLD VENIPUNCTURE: CPT | Performed by: ORTHOPAEDIC SURGERY

## 2019-02-19 PROCEDURE — 97161 PT EVAL LOW COMPLEX 20 MIN: CPT | Mod: GP

## 2019-02-19 PROCEDURE — 97530 THERAPEUTIC ACTIVITIES: CPT | Mod: GP

## 2019-02-19 PROCEDURE — 85018 HEMOGLOBIN: CPT | Performed by: ORTHOPAEDIC SURGERY

## 2019-02-19 PROCEDURE — 00000146 ZZHCL STATISTIC GLUCOSE BY METER IP

## 2019-02-19 RX ORDER — DIAZEPAM 5 MG
5 TABLET ORAL EVERY 8 HOURS PRN
Qty: 30 TABLET | Refills: 0
Start: 2019-02-19 | End: 2019-02-25

## 2019-02-19 RX ORDER — HYDROCODONE BITARTRATE AND ACETAMINOPHEN 5; 325 MG/1; MG/1
1-2 TABLET ORAL EVERY 4 HOURS PRN
Qty: 50 TABLET | Refills: 0
Start: 2019-02-19 | End: 2019-02-26

## 2019-02-19 RX ADMIN — DIAZEPAM 5 MG: 5 TABLET ORAL at 03:48

## 2019-02-19 RX ADMIN — HYDROCODONE BITARTRATE AND ACETAMINOPHEN 2 TABLET: 5; 325 TABLET ORAL at 08:39

## 2019-02-19 RX ADMIN — HYDROCODONE BITARTRATE AND ACETAMINOPHEN 2 TABLET: 5; 325 TABLET ORAL at 04:23

## 2019-02-19 RX ADMIN — DEXTRAN 70, AND HYPROMELLOSE 2910 3 DROP: 1; 3 SOLUTION/ DROPS OPHTHALMIC at 03:48

## 2019-02-19 ASSESSMENT — ACTIVITIES OF DAILY LIVING (ADL)
ADLS_ACUITY_SCORE: 13

## 2019-02-19 NOTE — PLAN OF CARE
POD 1. CMS intact. Bowel sounds +x4, + flatus, - BM. VSS. Incision WDl except erythema, small serosanguinous drainage. Hemovac 0 mL out, pulled at 0630. Dressing changed - CDI. Up with SBA, GB. C/o moderate pain, decreased with PRN Norco & Valium. Band aids to bilateral temples with dried drainage. . Discharge pending.

## 2019-02-19 NOTE — PROGRESS NOTES
POD #1   Doing quite well.   Neuro intact, voice ok, swallowing ok  No arm pain, good strength   Drain typical   Plan- mobilization, transition po pain medication, PT,   discharge likely today    Jonathon Acosta MD

## 2019-02-19 NOTE — PLAN OF CARE
7-11pm: A&O x4 CMS intact. Bowel sounds active, passing gas. On regular diet. VSS on RA. Dressing marked with dried drainage. Soft collar on for comfort. Band aids on bilateral head wounds. Hemovac in. Up with SBA, voiding without difficulty. Pain treated with prn norco. Discharge pending.

## 2019-02-19 NOTE — PLAN OF CARE
A&Ox4. CMS intact. Bowel sounds active, +flatus. VSS. Dressing CDI. Soft collar on. Up with SBA. C/o posterior neck pain, decreased with norco. Plan to discharge home. Discharge meds, instructions and follow up reviewed with patient prior to discharge.

## 2019-02-19 NOTE — PLAN OF CARE
A and O x4. Calm and cooperative. CMS/NV intact. Good equal strength. Dressing CDI. Hemovac with 10 mL bright red bloody drainage. Soft collar on for comfort. Pain in posterior neck with fair relief from prn PO Norco and Tylenol. Mild tenderness on bilateral scalp secondary to surgical pin sites, scant bleeding from both sites. Up to BR with SBA, voiding without difficulty and passing flatus. Tolerated regular diet for dinner. PCDs on when in bed. Wife at bedside.

## 2019-02-19 NOTE — PROGRESS NOTES
" 02/19/19 0800   Quick Adds   Type of Visit Initial PT Evaluation   Living Environment   Lives With child(dominguez), dependent;spouse  (18 YO son)   Living Arrangements house   Home Accessibility stairs to enter home;stairs within home   Number of Stairs, Main Entrance three   Number of Stairs, Within Home, Primary ten   Stair Railings, Within Home, Primary railing on right side (ascending)   Transportation Anticipated car, drives self;family or friend will provide   Living Environment Comment bedroom upstairs, walk-in shower   Self-Care   Usual Activity Tolerance good   Current Activity Tolerance good   Regular Exercise No   Functional Level Prior   Ambulation 0-->independent   Transferring 0-->independent   Toileting 0-->independent   Bathing 0-->independent   Communication 0-->understands/communicates without difficulty   Swallowing 0-->swallows foods/liquids without difficulty   Cognition 0 - no cognition issues reported   Fall history within last six months yes   Number of times patient has fallen within last six months 1   General Information   Onset of Illness/Injury or Date of Surgery - Date 02/18/19   Referring Physician Jonathon Acosta MD   Patient/Family Goals Statement \"Go home\"   Pertinent History of Current Problem (include personal factors and/or comorbidities that impact the POC) 48 YOM admitted with cervical radiculopathy now s/p C4-5, C5-6 posterior foraminotomy revision with hardware removal. PMH: Pt reports this is his 3rd cervical spine surgery and has had multiple lumbar spine surgeries   Precautions/Limitations fall precautions;spinal precautions   Weight-Bearing Status - LLE full weight-bearing   Weight-Bearing Status - RLE full weight-bearing   General Observations Pleasant and cooperative   General Info Comments Activity: ambulate   Cognitive Status Examination   Orientation orientation to person, place and time   Level of Consciousness alert   Follows Commands and Answers Questions 100% " "of the time;able to follow multistep instructions   Personal Safety and Judgment intact   Memory intact   Cognitive Comment no concerns   Pain Assessment   Patient Currently in Pain Yes, see Vital Sign flowsheet  (4/10 incision site)   Posture    Posture Forward head position;Protracted shoulders   Range of Motion (ROM)   ROM Comment BUE/LEs WFL   Strength   Strength Comments BUEs move easily against gravity. BLEs grossly 5/5 throughout   Bed Mobility   Bed Mobility Comments SBA supine>sit   Transfer Skills   Transfer Comments Independent   Gait   Gait Comments Independent gait x 500' with no LOB or gait deficits observed. Modified independent up/down 10 stairs with a single railing and reciprocal pattern   Balance   Balance no deficits were identified   Sensory Examination   Sensory Perception no deficits were identified   Coordination   Coordination no deficits were identified   General Therapy Interventions   Planned Therapy Interventions home program guidelines;progressive activity/exercise   Clinical Impression   Criteria for Skilled Therapeutic Intervention yes, treatment indicated   PT Diagnosis Impaired posture   Influenced by the following impairments pain   Functional limitations due to impairments bed mobility   Clinical Presentation Stable/Uncomplicated   Clinical Presentation Rationale see above   Clinical Decision Making (Complexity) Low complexity   Therapy Frequency` other (see comments)  (eval plus one treat)   Predicted Duration of Therapy Intervention (days/wks) 1 day   Anticipated Discharge Disposition Home with Assist   Risk & Benefits of therapy have been explained Yes   Patient, Family & other staff in agreement with plan of care Yes   Clinical Impression Comments OT screen completed with no skilled OT needs identified. Pt's family will assist with house and yard tasks   [facility] AM-PAC TM \"6 Clicks\"   2016, Trustees of [facility], under license to [employer].  All rights " "reserved.   6 Clicks Short Forms Basic Mobility Inpatient Short Form   [facility] [place] AM-PAC  \"6 Clicks\" V.2 Basic Mobility Inpatient Short Form   1. Turning from your back to your side while in a flat bed without using bedrails? 4 - None   2. Moving from lying on your back to sitting on the side of a flat bed without using bedrails? 4 - None   3. Moving to and from a bed to a chair (including a wheelchair)? 4 - None   4. Standing up from a chair using your arms (e.g., wheelchair, or bedside chair)? 4 - None   5. To walk in hospital room? 4 - None   6. Climbing 3-5 steps with a railing? 4 - None   Basic Mobility Raw Score (Score out of 24.Lower scores equate to lower levels of function) 24   Total Evaluation Time   Total Evaluation Time (Minutes) 14     "

## 2019-02-19 NOTE — DISCHARGE INSTRUCTIONS
Discharge Instructions following Cervical Spine Surgery      Wound Care:    Your incision(s) are closed with a suture that will need to be removed.  You will have steri strips (butterfly tapes) across your incision(s).  Leave the steri strips in place until you come to the office to have your sutures removed.  You will need to make an appointment for 1 to 2 weeks following your surgery to have your suture(s) removed and to have an x-ray check.  Please call [phone] to make an appointment if you haven t already done so.      You do not need to cover your incisions with plastic when you are showering, once you are home from the hospital.  Let the water run over your incision when in the shower, then pat the incision dry.  Cover your incision with a clean bandage after you shower to keep you incision clean until your sutures are removed.    If you had bone graft taken from your iliac crest for your procedure, do not take a bath or sit in a hot tub until your sutures are removed and your incision is well healed.    Pain Medication:    You will be sent home with pain medication from the hospital.  Gradually decrease your usage of the pain medication as you start to feel better.  If you are in need of a refill of pain medication, please call the office and talk to Kenna.  Please plan ahead and call during regular office hours if you need a refill.  Our office hours are Monday through Friday from 8:00 am until 4:30 pm.  Pain medication cannot be refilled in the evening or on weekends.  Narcotic pain medication is addictive and will not be prescribed on a long term basis.  Kenna will be able to assist you in weaning from these medications.    Avoid the use of any non-steroidal anti-inflammatory medications (Advil, Aleve, Celebrex etc.) for 6 weeks after your surgery.  These medications inhibit bone growth and Dr. Acosta would prefer if you would avoid these medications.  If you do not need narcotic pain medication,  Tylenol products would be a good alternative for mild pain control.      Activity:    You have a 20 pound lifting restriction for at least 6 weeks.  Dr. Acosta will inform you at your 6-week follow-up appointment if you can increase your weight limit.      Walking is your best activity.  Remember to walk daily and increase your distance and time as tolerated. Continue the daily isometric exercises that you were taught by the physical therapist while you were in the hospital.  This will help with the stiffness caused by the brace.    You cannot drive if you have taken narcotic pain medication within eight hours.  You may resume driving once you are not taking narcotic pain medication and you feel comfortable to operate your vehicle safely.    If you have any questions or concerns once you are home from the hospital, please call the office and speak with Dr. Acosta s nurse, Kenna.  She can be reached at [phone].

## 2019-02-19 NOTE — PLAN OF CARE
PT: PT orders received, initial eval and treatment completed. Patient lives with his wife and 17 year old son in a house with stairs to enter and to access his bedroom, walk-in shower. Pt was previously independent with all ADLs and mobility without a device, drives, is on disability. Pt presents POD 1 posterior cervical foraminotomy revision at C4-5, C5-6 with hardware removal.    Discharge Planner PT   Patient plan for discharge: home  Current status: Instructed pt in spine precautions and log roll technique. Pt demonstrates independence with log roll, transfers, gait x 500' with no LOB or gait deficits observed, up/down stairs with a railing. OT screen completed with no skilled OT needs identified.   Barriers to return to prior living situation: none  Recommendations for discharge: home  Rationale for recommendations: No further skilled PT needs. Encouraged ambulation for exercise. Family will assist with household and yard related tasks. Will sign off.       Entered by: Linda Alex 02/19/2019 9:00 AM

## 2019-02-25 NOTE — DISCHARGE SUMMARY
Admit Date:     2019   Discharge Date:     2019      Mr. Pearl was admitted for operative treatment of recalcitrant typical left C5 radiculopathy.  He underwent uncomplicated laminoforaminotomies.  His history is notable for having had a technically successful remote laminoplasty.  He underwent instrumentation removal and revision foraminotomy.  The findings were notable for obvious fairly severe compression of the left C5 nerve root.  C5-C6 was decompressed as well, given some eccentric findings of a paracentral disc protrusion at the C5-C6 level.  The procedure went smoothly.  He awoke neurologically intact.  His C5 strength appeared to be improving over the course of the 24-hour observation.  On the first postoperative morning, he was doing well with good pain control and no new neurological signs or symptoms.  There were no signs of C5 palsy.  He was able to be mobilized in the usual way and discharged to home shortly thereafter to be seen in the outpatient setting in 2 weeks' time for suture removal and clinical and radiographic followup.         RICKY YOUNG MD             D: 2019   T: 2019   MT: SRAVANTHI      Name:     CHEPE PEARL   MRN:      [id number]        Account:        [id number]   :      1970           Admit Date:     2019                                  Discharge Date: 2019      Document: [id number]

## 2020-05-14 ENCOUNTER — HOSPITAL ENCOUNTER (EMERGENCY)
Facility: CLINIC | Age: 50
Discharge: HOME | End: 2020-05-14
Attending: EMERGENCY MEDICINE | Admitting: EMERGENCY MEDICINE
Payer: COMMERCIAL

## 2020-05-14 ENCOUNTER — APPOINTMENT (OUTPATIENT)
Dept: GENERAL RADIOLOGY | Facility: CLINIC | Age: 50
End: 2020-05-14
Attending: EMERGENCY MEDICINE
Payer: COMMERCIAL

## 2020-05-14 VITALS
RESPIRATION RATE: 16 BRPM | HEART RATE: 88 BPM | TEMPERATURE: 98.3 F | SYSTOLIC BLOOD PRESSURE: 141 MMHG | DIASTOLIC BLOOD PRESSURE: 102 MMHG | OXYGEN SATURATION: 98 %

## 2020-05-14 DIAGNOSIS — S63.289A DISLOCATION OF PROXIMAL INTERPHALANGEAL JOINT OF FINGER, INITIAL ENCOUNTER: ICD-10-CM

## 2020-05-14 PROCEDURE — 73140 X-RAY EXAM OF FINGER(S): CPT | Mod: LT

## 2020-05-14 PROCEDURE — 26770 TREAT FINGER DISLOCATION: CPT | Mod: F1

## 2020-05-14 PROCEDURE — 99284 EMERGENCY DEPT VISIT MOD MDM: CPT | Mod: 25

## 2020-05-14 PROCEDURE — 25000132 ZZH RX MED GY IP 250 OP 250 PS 637: Mod: GY | Performed by: EMERGENCY MEDICINE

## 2020-05-14 RX ORDER — BUPIVACAINE HYDROCHLORIDE 5 MG/ML
INJECTION, SOLUTION PERINEURAL
Status: DISCONTINUED
Start: 2020-05-14 | End: 2020-05-14 | Stop reason: HOSPADM

## 2020-05-14 RX ORDER — IBUPROFEN 600 MG/1
600 TABLET, FILM COATED ORAL ONCE
Status: COMPLETED | OUTPATIENT
Start: 2020-05-14 | End: 2020-05-14

## 2020-05-14 RX ADMIN — IBUPROFEN 600 MG: 600 TABLET ORAL at 16:51

## 2020-05-14 NOTE — ED AVS SNAPSHOT
[facility] Emergency Department  [address]  [facility] [address]  Phone:  [phone]  Fax:  [phone]                                    Rah Pearl   MRN: [id number]    Department:  [facility] Emergency Department   Date of Visit:  5/14/2020           After Visit Summary Signature Page    I have received my discharge instructions, and my questions have been answered. I have discussed any challenges I see with this plan with the nurse or doctor.    ..........................................................................................................................................  Patient/Patient Representative Signature      ..........................................................................................................................................  Patient Representative Print Name and Relationship to Patient    ..................................................               ................................................  Date                                   Time    ..........................................................................................................................................  Reviewed by Signature/Title    ...................................................              ..............................................  Date                                               Time          22EPIC Rev 08/18

## 2020-05-14 NOTE — ED PROVIDER NOTES
History     Chief Complaint: Finger pain    HPI   Rah Pearl is a right-handed 49 year old male who presents following a fall with left index finger pain. He notes he is unsure of how he fell, though he still has feeling in his finger. He denies any other complaints.     Allergies:  Oxycontin    Medications:    The patient is not currently taking any prescribed medications.    Past Medical History:    Arthritis  Chronic infection  Chronic pain  Herpes  Lumbar spondylitis  Neuralgia  Opioid dependence  Radiculopathy  Spinal stenosis    Past Surgical History:    Appendectomy  Lumbar fusion x2  Other back surgery x4    Family History:    History reviewed. No pertinent family history.     Social History:  Smoking status: Former smoker, quit 2011  Alcohol use: No  Drug use: Yes, opioid   PCP: Andrzej Harper  Marital Status:   [2]    Review of Systems   Musculoskeletal:        Finger pain   All other systems reviewed and are negative.      Physical Exam     Patient Vitals for the past 24 hrs:   BP Temp Pulse Resp SpO2   05/14/20 1605 (!) 168/117 98.3  F (36.8  C) 107 18 96 %     Physical Exam  Constitutional:  Alert, attentive, GCS 15, mild pain distress  Cardiovascular:  2+ radial and ulnar pulses to the bilateral upper extremities   Neurological:  5/5 strength to the radian, ulnar and median motor distributions;      sensation intact to light touch to the radian, ulnar and median distributions  MSK:   Deformity consistent with dislocation of the left index finger PIP; ROM limited distal to the PIP; no prabhakar tenderness except at the PIP;   Skin:    Skin is warm and dry.       Emergency Department Course   Imaging:  Radiographic findings were communicated with the patient who voiced understanding of the findings.    Left Fingers XR, 2-3 views:  Normal alignment. There is a 1-2 mm thin calcification along the dorsum of the head of the index finger proximal phalanx which could be due to a small capsular  avulsion. No additional abnormalities are evident.   As read by Radiology.    [facility]    -Dislocation - Upper Extremity    Date/Time: 5/14/2020 4:40 PM  Performed by: Brian Turner MD  Authorized by: Brian Turner MD       LOCATION     Location:  Finger    Finger location:  L index finger    Finger dislocation type: PIP      PRE PROCEDURE ASSESSMENT      Pre-procedure imaging:  X-ray    ANESTHESIA (see MAR for exact dosages)      Anesthesia method:  Local infiltration    Local anesthetic:  Bupivacaine 0.5% w/o epi    PROCEDURE DETAILS      Manipulation performed: yes      Finger reduction method:  Direct traction    Reduction successful: yes      Reduction confirmed with imaging: yes      Immobilization:  Tape    POST PROCEDURE DETAILS     Neurological function: normal      PROCEDURE   Patient Tolerance:  Patient tolerated the procedure well with no immediate complications          Interventions:  1651 Ibuprofen 600 mg PO    Emergency Department Course:  Past medical records, nursing notes, and vitals reviewed.  1617: I performed an exam of the patient and obtained history, as documented above.    1620: I numbed the patient's finger.     1633: I rechecked the patient.    1638: I rechecked the patient and reduced his finger.     The patient was sent for a Left Fingers XR while in the emergency department, findings above.    1734: I rechecked the patient. Findings and plan explained to the patient. Patient discharged home with instructions regarding supportive care, medications, and reasons to return. The importance of close follow-up was reviewed.     Impression & Plan      Medical Decision Making:  This is a very pleasant right hand dominant 49 year old male who presented with a closed left 2nd digit PIP dislocation. There is no evidence as above of neurovascular compromise, nor of compartment syndrome.  The patient was placed in a marzena tape splint after reduction by myself, with  good pain relief.  X-rays show possible capsular avulsion fracture which I discussed could cause longer term issues or require further intervention. The patient was provided ibuprofen with the plan for strict return precautions including for pain, swelling, numbness, or any other concerning symptoms, as well as follow-up with orthopedics in 3-5 days.      Diagnosis:    ICD-10-CM    1. Dislocation of proximal interphalangeal joint of finger, initial encounter  S63.289A        Disposition: discharged to home     I, Inder Huddleston, am serving as a scribe at 4:24 PM on 5/14/2020 to document services personally performed by Brian Turner MD based on my observations and the provider's statements to me.     Inder Huddleston  5/14/2020   [facility] EMERGENCY DEPARTMENT       Brian Turner MD  05/14/20 [id number]

## 2020-05-14 NOTE — ED TRIAGE NOTES
Fell off ladder. Landed on right elbow. Minor abrasions to right arm. Pain in right 2nd digit. Deformity noted to finger. Sensation intact. Denies pain elsewhere

## (undated) DEVICE — SU VICRYL 0 CT-1 CR 8X18" J740D

## (undated) DEVICE — MANIFOLD NEPTUNE 4 PORT 700-20

## (undated) DEVICE — NDL 18GA 1.5" 305196

## (undated) DEVICE — DRSG KERLIX FLUFFS X5

## (undated) DEVICE — GLOVE PROTEXIS W/NEU-THERA 7.0  2D73TE70

## (undated) DEVICE — SPONGE KITTNER 31001010

## (undated) DEVICE — MIDAS REX DISSECTING TOOL  14MH30

## (undated) DEVICE — GLOVE PROTEXIS BLUE W/NEU-THERA 7.5  2D73EB75

## (undated) DEVICE — DRSG STERI STRIP 1/2X4" R1547

## (undated) DEVICE — DRAPE MAYO STAND 23X54 8337

## (undated) DEVICE — SU VICRYL 2-0 CP-1 27" UND J266H

## (undated) DEVICE — NDL ANGIOCATH 14GA 2" 4088

## (undated) DEVICE — DRAPE SHEET REV FOLD 3/4 9349

## (undated) DEVICE — SPONGE BALL KERLIX ROUND XL W/O STRING LATEX 4935

## (undated) DEVICE — PACK SPINE SM CUSTOM SNE15SSFSK

## (undated) DEVICE — SPONGE SURGIFOAM 100 1974

## (undated) DEVICE — LINEN TOWEL PACK X5 5464

## (undated) DEVICE — ESU CORD BIPOLAR 12' E0509

## (undated) DEVICE — DRAIN ROUND W/RESERV KIT JACKSON PRATT 10FR 400ML SU130-402D

## (undated) DEVICE — DECANTER BAG 2002S

## (undated) DEVICE — DRSG ADAPTIC 3X8" 6113

## (undated) DEVICE — SYR 20ML LL

## (undated) DEVICE — SU PROLENE 3-0 FS-1 18" 8684G

## (undated) DEVICE — SU VICRYL 0 CT 36" J358H

## (undated) DEVICE — PIN SKULL MAYFIELD ADULT TITANIUM 3/PK A1120

## (undated) DEVICE — GLOVE PROTEXIS W/NEU-THERA 9.0  2D73TE90

## (undated) DEVICE — IMM COLLAR CERVICAL MED UNIVERSAL 3X24" 79-83500

## (undated) DEVICE — SOL WATER IRRIG 1000ML BOTTLE 2F7114

## (undated) DEVICE — SPONGE SURGIFOAM 01GM POWDER 1978

## (undated) DEVICE — DRAPE IOBAN INCISE 23X17" 6650EZ

## (undated) DEVICE — SPONGE COTTONOID 1/2X3" 80-1407

## (undated) DEVICE — ESU GROUND PAD UNIVERSAL W/O CORD

## (undated) DEVICE — DRSG GAUZE 4X4" 3033

## (undated) DEVICE — GLOVE PROTEXIS POWDER FREE 9.0 ORTHOPEDIC 2D73ET90

## (undated) RX ORDER — GABAPENTIN 300 MG/1
CAPSULE ORAL
Status: DISPENSED
Start: 2019-02-18

## (undated) RX ORDER — CEFAZOLIN SODIUM 2 G/100ML
INJECTION, SOLUTION INTRAVENOUS
Status: DISPENSED
Start: 2019-02-18

## (undated) RX ORDER — HYDROMORPHONE HYDROCHLORIDE 1 MG/ML
INJECTION, SOLUTION INTRAMUSCULAR; INTRAVENOUS; SUBCUTANEOUS
Status: DISPENSED
Start: 2019-02-18

## (undated) RX ORDER — PROPOFOL 10 MG/ML
INJECTION, EMULSION INTRAVENOUS
Status: DISPENSED
Start: 2019-02-18

## (undated) RX ORDER — LIDOCAINE HYDROCHLORIDE 20 MG/ML
INJECTION, SOLUTION EPIDURAL; INFILTRATION; INTRACAUDAL; PERINEURAL
Status: DISPENSED
Start: 2019-02-18

## (undated) RX ORDER — GINSENG 100 MG
CAPSULE ORAL
Status: DISPENSED
Start: 2019-02-18

## (undated) RX ORDER — DEXAMETHASONE SODIUM PHOSPHATE 4 MG/ML
INJECTION, SOLUTION INTRA-ARTICULAR; INTRALESIONAL; INTRAMUSCULAR; INTRAVENOUS; SOFT TISSUE
Status: DISPENSED
Start: 2019-02-18

## (undated) RX ORDER — GLYCOPYRROLATE 0.2 MG/ML
INJECTION, SOLUTION INTRAMUSCULAR; INTRAVENOUS
Status: DISPENSED
Start: 2019-02-18

## (undated) RX ORDER — VECURONIUM BROMIDE 1 MG/ML
INJECTION, POWDER, LYOPHILIZED, FOR SOLUTION INTRAVENOUS
Status: DISPENSED
Start: 2019-02-18

## (undated) RX ORDER — NEOSTIGMINE METHYLSULFATE 1 MG/ML
VIAL (ML) INJECTION
Status: DISPENSED
Start: 2019-02-18

## (undated) RX ORDER — VANCOMYCIN HYDROCHLORIDE 1 G/20ML
INJECTION, POWDER, LYOPHILIZED, FOR SOLUTION INTRAVENOUS
Status: DISPENSED
Start: 2019-02-18

## (undated) RX ORDER — FENTANYL CITRATE 50 UG/ML
INJECTION, SOLUTION INTRAMUSCULAR; INTRAVENOUS
Status: DISPENSED
Start: 2019-02-18

## (undated) RX ORDER — ONDANSETRON 2 MG/ML
INJECTION INTRAMUSCULAR; INTRAVENOUS
Status: DISPENSED
Start: 2019-02-18

## (undated) RX ORDER — BUPIVACAINE HYDROCHLORIDE AND EPINEPHRINE 2.5; 5 MG/ML; UG/ML
INJECTION, SOLUTION EPIDURAL; INFILTRATION; INTRACAUDAL; PERINEURAL
Status: DISPENSED
Start: 2019-02-18

## (undated) RX ORDER — KETOROLAC TROMETHAMINE 30 MG/ML
INJECTION, SOLUTION INTRAMUSCULAR; INTRAVENOUS
Status: DISPENSED
Start: 2019-02-18

## (undated) RX ORDER — ACETAMINOPHEN 325 MG/1
TABLET ORAL
Status: DISPENSED
Start: 2019-02-18